# Patient Record
Sex: FEMALE | Race: ASIAN | NOT HISPANIC OR LATINO
[De-identification: names, ages, dates, MRNs, and addresses within clinical notes are randomized per-mention and may not be internally consistent; named-entity substitution may affect disease eponyms.]

---

## 2022-12-14 ENCOUNTER — APPOINTMENT (OUTPATIENT)
Dept: INTERNAL MEDICINE | Facility: CLINIC | Age: 22
End: 2022-12-14

## 2023-01-05 ENCOUNTER — APPOINTMENT (OUTPATIENT)
Dept: ORTHOPEDIC SURGERY | Facility: CLINIC | Age: 23
End: 2023-01-05
Payer: COMMERCIAL

## 2023-01-05 DIAGNOSIS — M46.1 SACROILIITIS, NOT ELSEWHERE CLASSIFIED: ICD-10-CM

## 2023-01-05 DIAGNOSIS — S39.012A STRAIN OF MUSCLE, FASCIA AND TENDON OF LOWER BACK, INITIAL ENCOUNTER: ICD-10-CM

## 2023-01-05 DIAGNOSIS — S23.9XXA SPRAIN OF UNSPECIFIED PARTS OF THORAX, INITIAL ENCOUNTER: ICD-10-CM

## 2023-01-05 PROCEDURE — 72070 X-RAY EXAM THORAC SPINE 2VWS: CPT

## 2023-01-05 PROCEDURE — 99203 OFFICE O/P NEW LOW 30 MIN: CPT

## 2023-01-05 PROCEDURE — 72170 X-RAY EXAM OF PELVIS: CPT

## 2023-01-05 PROCEDURE — 72110 X-RAY EXAM L-2 SPINE 4/>VWS: CPT

## 2023-01-05 NOTE — HISTORY OF PRESENT ILLNESS
[Mid-back] : mid-back [Lower back] : lower back [8] : 8 [7] : 7 [Sharp] : sharp [Intermittent] : intermittent [Nothing helps with pain getting better] : Nothing helps with pain getting better [Standing] : standing [Walking] : walking [de-identified] : 1/5/23- JAZLYN 22 year old F here for Mid and lower Back and Bilateral hip, onset pain for 5 month since giving birth \par Had epidural and spinal HA. No bb incontinence. \par  [] : no [FreeTextEntry1] : Bilateral Hips  [FreeTextEntry7] : lower back to

## 2023-01-05 NOTE — IMAGING
[de-identified] : LSPINE\par Inspection: No rash or ecchymosis\par Palpation: No tenderness to palpation or spasm in bilateral thoracic and lumbar paraspinal musculature, L SI joint tenderness to palpation\par ROM: Full with stiffness\par Strength: 5/5 bilateral hip flexors, knee extensors, ankle dorsiflexors, EHL, ankle plantarflexors\par Sensation: Sensation present to light touch bilateral L2-S1 distributions\par Provocative maneuvers: Negative R straight leg raise; + L SLR\par \par Bilateral hips-\par Palpation: No tenderness to palpation over greater trochanter or IT band\par ROM: No pain with flexion and internal rotation  [No bony abnormalities] : No bony abnormalities [AP] : anteroposterior [There are no fractures, subluxations or dislocations. No significant abnormalities are seen] : There are no fractures, subluxations or dislocations. No significant abnormalities are seen [Straightening consistent with spasm] : Straightening consistent with spasm

## 2023-01-19 ENCOUNTER — TRANSCRIPTION ENCOUNTER (OUTPATIENT)
Age: 23
End: 2023-01-19

## 2023-01-23 ENCOUNTER — NON-APPOINTMENT (OUTPATIENT)
Age: 23
End: 2023-01-23

## 2023-01-23 ENCOUNTER — APPOINTMENT (OUTPATIENT)
Dept: INTERNAL MEDICINE | Facility: CLINIC | Age: 23
End: 2023-01-23
Payer: COMMERCIAL

## 2023-01-23 VITALS
HEART RATE: 94 BPM | RESPIRATION RATE: 15 BRPM | SYSTOLIC BLOOD PRESSURE: 111 MMHG | BODY MASS INDEX: 24.24 KG/M2 | DIASTOLIC BLOOD PRESSURE: 77 MMHG | HEIGHT: 64 IN | TEMPERATURE: 98.7 F | WEIGHT: 142 LBS | OXYGEN SATURATION: 98 %

## 2023-01-23 DIAGNOSIS — Z87.19 PERSONAL HISTORY OF OTHER DISEASES OF THE DIGESTIVE SYSTEM: ICD-10-CM

## 2023-01-23 DIAGNOSIS — Z83.49 FAMILY HISTORY OF OTHER ENDOCRINE, NUTRITIONAL AND METABOLIC DISEASES: ICD-10-CM

## 2023-01-23 DIAGNOSIS — N63.10 UNSPECIFIED LUMP IN THE RIGHT BREAST, UNSPECIFIED QUADRANT: ICD-10-CM

## 2023-01-23 DIAGNOSIS — Z82.49 FAMILY HISTORY OF ISCHEMIC HEART DISEASE AND OTHER DISEASES OF THE CIRCULATORY SYSTEM: ICD-10-CM

## 2023-01-23 DIAGNOSIS — F32.A DEPRESSION, UNSPECIFIED: ICD-10-CM

## 2023-01-23 PROCEDURE — G0444 DEPRESSION SCREEN ANNUAL: CPT | Mod: 59

## 2023-01-23 PROCEDURE — 90471 IMMUNIZATION ADMIN: CPT

## 2023-01-23 PROCEDURE — 90686 IIV4 VACC NO PRSV 0.5 ML IM: CPT

## 2023-01-23 PROCEDURE — 99395 PREV VISIT EST AGE 18-39: CPT | Mod: 25

## 2023-01-24 ENCOUNTER — APPOINTMENT (OUTPATIENT)
Dept: OBGYN | Facility: CLINIC | Age: 23
End: 2023-01-24
Payer: COMMERCIAL

## 2023-01-24 VITALS
SYSTOLIC BLOOD PRESSURE: 104 MMHG | WEIGHT: 140 LBS | DIASTOLIC BLOOD PRESSURE: 74 MMHG | BODY MASS INDEX: 23.9 KG/M2 | HEIGHT: 64 IN

## 2023-01-24 PROCEDURE — 99385 PREV VISIT NEW AGE 18-39: CPT

## 2023-01-24 NOTE — REASON FOR VISIT
[Initial] : an initial consultation for [Spouse] : spouse [Patient Declined  Services] : - None: Patient declined  services [FreeTextEntry3] :  to translate

## 2023-01-24 NOTE — HISTORY OF PRESENT ILLNESS
[Condoms] : uses condoms [Y] : Patient is sexually active [FreeTextEntry1] : Establish care \par Moved here from [facility] \par She had a negative breast biopsy -fibroma of breast  [TextBox_31] : never  [PGxTotal] : 1 [facility] : [facility]

## 2023-01-24 NOTE — PLAN
[FreeTextEntry1] : Patient to return post menses for pap \par discuss Gardasil and will get post breast feeding\par

## 2023-01-24 NOTE — PHYSICAL EXAM
[Chaperone Present] : A chaperone was present in the examining room during all aspects of the physical examination [Appropriately responsive] : appropriately responsive [Alert] : alert [No Acute Distress] : no acute distress [No Lymphadenopathy] : no lymphadenopathy [Regular Rate Rhythm] : regular rate rhythm [No Murmurs] : no murmurs [Clear to Auscultation B/L] : clear to auscultation bilaterally [Soft] : soft [Non-tender] : non-tender [Non-distended] : non-distended [No HSM] : No HSM [No Lesions] : no lesions [No Mass] : no mass [Oriented x3] : oriented x3 [Examination Of The Breasts] : a normal appearance [Normal] : normal [No Masses] : no breast masses were palpable [FreeTextEntry1] : patient with menses

## 2023-01-26 LAB
ALBUMIN SERPL ELPH-MCNC: 4.7 G/DL
ALP BLD-CCNC: 95 U/L
ALT SERPL-CCNC: 28 U/L
ANION GAP SERPL CALC-SCNC: 12 MMOL/L
AST SERPL-CCNC: 26 U/L
BASOPHILS # BLD AUTO: 0.04 K/UL
BASOPHILS NFR BLD AUTO: 0.6 %
BILIRUB SERPL-MCNC: 0.6 MG/DL
BUN SERPL-MCNC: 8 MG/DL
CALCIUM SERPL-MCNC: 10.2 MG/DL
CHLORIDE SERPL-SCNC: 105 MMOL/L
CHOLEST SERPL-MCNC: 215 MG/DL
CO2 SERPL-SCNC: 22 MMOL/L
CREAT SERPL-MCNC: 0.71 MG/DL
EGFR: 122 ML/MIN/1.73M2
EOSINOPHIL # BLD AUTO: 0.25 K/UL
EOSINOPHIL NFR BLD AUTO: 3.8 %
ESTIMATED AVERAGE GLUCOSE: 105 MG/DL
GLUCOSE SERPL-MCNC: 87 MG/DL
HBA1C MFR BLD HPLC: 5.3 %
HCT VFR BLD CALC: 41.5 %
HDLC SERPL-MCNC: 29 MG/DL
HGB BLD-MCNC: 12.8 G/DL
IMM GRANULOCYTES NFR BLD AUTO: 0.3 %
LDLC SERPL CALC-MCNC: 112 MG/DL
LYMPHOCYTES # BLD AUTO: 1.99 K/UL
LYMPHOCYTES NFR BLD AUTO: 30.2 %
MAN DIFF?: NORMAL
MCHC RBC-ENTMCNC: 26.1 PG
MCHC RBC-ENTMCNC: 30.8 GM/DL
MCV RBC AUTO: 84.5 FL
MONOCYTES # BLD AUTO: 0.49 K/UL
MONOCYTES NFR BLD AUTO: 7.4 %
NEUTROPHILS # BLD AUTO: 3.79 K/UL
NEUTROPHILS NFR BLD AUTO: 57.7 %
NONHDLC SERPL-MCNC: 187 MG/DL
PLATELET # BLD AUTO: 274 K/UL
POTASSIUM SERPL-SCNC: 4.4 MMOL/L
PROT SERPL-MCNC: 7.7 G/DL
RBC # BLD: 4.91 M/UL
RBC # FLD: 13.5 %
SODIUM SERPL-SCNC: 139 MMOL/L
TRIGL SERPL-MCNC: 371 MG/DL
TSH SERPL-ACNC: 1.38 UIU/ML
WBC # FLD AUTO: 6.58 K/UL

## 2023-02-01 ENCOUNTER — APPOINTMENT (OUTPATIENT)
Dept: OTOLARYNGOLOGY | Facility: CLINIC | Age: 23
End: 2023-02-01

## 2023-02-03 ENCOUNTER — APPOINTMENT (OUTPATIENT)
Dept: ULTRASOUND IMAGING | Facility: CLINIC | Age: 23
End: 2023-02-03

## 2023-02-06 ENCOUNTER — APPOINTMENT (OUTPATIENT)
Dept: OBGYN | Facility: CLINIC | Age: 23
End: 2023-02-06
Payer: COMMERCIAL

## 2023-02-06 ENCOUNTER — NON-APPOINTMENT (OUTPATIENT)
Age: 23
End: 2023-02-06

## 2023-02-06 ENCOUNTER — APPOINTMENT (OUTPATIENT)
Dept: ALLERGY | Facility: CLINIC | Age: 23
End: 2023-02-06
Payer: COMMERCIAL

## 2023-02-06 VITALS
BODY MASS INDEX: 23.9 KG/M2 | TEMPERATURE: 98 F | SYSTOLIC BLOOD PRESSURE: 100 MMHG | WEIGHT: 140 LBS | OXYGEN SATURATION: 97 % | HEIGHT: 64 IN | HEART RATE: 78 BPM | DIASTOLIC BLOOD PRESSURE: 70 MMHG

## 2023-02-06 VITALS
HEIGHT: 64 IN | DIASTOLIC BLOOD PRESSURE: 78 MMHG | WEIGHT: 140 LBS | BODY MASS INDEX: 23.9 KG/M2 | SYSTOLIC BLOOD PRESSURE: 121 MMHG

## 2023-02-06 DIAGNOSIS — L85.8 OTHER SPECIFIED EPIDERMAL THICKENING: ICD-10-CM

## 2023-02-06 LAB
HCG UR QL: NEGATIVE
QUALITY CONTROL: YES

## 2023-02-06 PROCEDURE — 81025 URINE PREGNANCY TEST: CPT

## 2023-02-06 PROCEDURE — 99203 OFFICE O/P NEW LOW 30 MIN: CPT | Mod: 25

## 2023-02-06 PROCEDURE — 90471 IMMUNIZATION ADMIN: CPT

## 2023-02-06 PROCEDURE — 99213 OFFICE O/P EST LOW 20 MIN: CPT | Mod: 25

## 2023-02-06 PROCEDURE — 95004 PERQ TESTS W/ALRGNC XTRCS: CPT

## 2023-02-06 PROCEDURE — 90651 9VHPV VACCINE 2/3 DOSE IM: CPT

## 2023-02-06 PROCEDURE — 95018 ALL TSTG PERQ&IQ DRUGS/BIOL: CPT

## 2023-02-06 NOTE — HISTORY OF PRESENT ILLNESS
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

## 2023-02-07 ENCOUNTER — APPOINTMENT (OUTPATIENT)
Dept: INTERNAL MEDICINE | Facility: CLINIC | Age: 23
End: 2023-02-07

## 2023-02-09 ENCOUNTER — NON-APPOINTMENT (OUTPATIENT)
Age: 23
End: 2023-02-09

## 2023-02-09 ENCOUNTER — TRANSCRIPTION ENCOUNTER (OUTPATIENT)
Age: 23
End: 2023-02-09

## 2023-02-09 DIAGNOSIS — N39.0 URINARY TRACT INFECTION, SITE NOT SPECIFIED: ICD-10-CM

## 2023-02-09 LAB — BACTERIA UR CULT: ABNORMAL

## 2023-02-13 ENCOUNTER — NON-APPOINTMENT (OUTPATIENT)
Age: 23
End: 2023-02-13

## 2023-02-13 LAB — CYTOLOGY CVX/VAG DOC THIN PREP: NORMAL

## 2023-02-14 ENCOUNTER — NON-APPOINTMENT (OUTPATIENT)
Age: 23
End: 2023-02-14

## 2023-02-14 ENCOUNTER — EMERGENCY (EMERGENCY)
Facility: HOSPITAL | Age: 23
LOS: 1 days | Discharge: HOME | End: 2023-02-14
Attending: EMERGENCY MEDICINE
Payer: COMMERCIAL

## 2023-02-14 VITALS
DIASTOLIC BLOOD PRESSURE: 71 MMHG | RESPIRATION RATE: 19 BRPM | SYSTOLIC BLOOD PRESSURE: 112 MMHG | TEMPERATURE: 98 F | HEART RATE: 89 BPM | HEIGHT: 64 IN | OXYGEN SATURATION: 100 % | WEIGHT: 139.99 LBS

## 2023-02-14 VITALS
HEART RATE: 82 BPM | TEMPERATURE: 98 F | OXYGEN SATURATION: 100 % | SYSTOLIC BLOOD PRESSURE: 111 MMHG | RESPIRATION RATE: 18 BRPM | DIASTOLIC BLOOD PRESSURE: 71 MMHG

## 2023-02-14 DIAGNOSIS — N63.10 UNSPECIFIED LUMP IN THE RIGHT BREAST, UNSPECIFIED QUADRANT: ICD-10-CM

## 2023-02-14 LAB
ALBUMIN SERPL ELPH-MCNC: 4.5 G/DL — SIGNIFICANT CHANGE UP (ref 3.3–5)
ALP SERPL-CCNC: 77 U/L — SIGNIFICANT CHANGE UP (ref 40–120)
ALT FLD-CCNC: 30 U/L — SIGNIFICANT CHANGE UP (ref 10–45)
ANION GAP SERPL CALC-SCNC: 11 MMOL/L — SIGNIFICANT CHANGE UP (ref 5–17)
AST SERPL-CCNC: 24 U/L — SIGNIFICANT CHANGE UP (ref 10–40)
BASOPHILS # BLD AUTO: 0.03 K/UL — SIGNIFICANT CHANGE UP (ref 0–0.2)
BASOPHILS NFR BLD AUTO: 0.4 % — SIGNIFICANT CHANGE UP (ref 0–2)
BILIRUB SERPL-MCNC: 0.6 MG/DL — SIGNIFICANT CHANGE UP (ref 0.2–1.2)
BUN SERPL-MCNC: 9 MG/DL — SIGNIFICANT CHANGE UP (ref 7–23)
CALCIUM SERPL-MCNC: 9.7 MG/DL — SIGNIFICANT CHANGE UP (ref 8.4–10.5)
CHLORIDE SERPL-SCNC: 103 MMOL/L — SIGNIFICANT CHANGE UP (ref 96–108)
CO2 SERPL-SCNC: 22 MMOL/L — SIGNIFICANT CHANGE UP (ref 22–31)
CREAT SERPL-MCNC: 0.72 MG/DL — SIGNIFICANT CHANGE UP (ref 0.5–1.3)
D DIMER BLD IA.RAPID-MCNC: 160 NG/ML DDU — SIGNIFICANT CHANGE UP
EGFR: 120 ML/MIN/1.73M2 — SIGNIFICANT CHANGE UP
EOSINOPHIL # BLD AUTO: 0.24 K/UL — SIGNIFICANT CHANGE UP (ref 0–0.5)
EOSINOPHIL NFR BLD AUTO: 3.2 % — SIGNIFICANT CHANGE UP (ref 0–6)
GLUCOSE SERPL-MCNC: 118 MG/DL — HIGH (ref 70–99)
HCT VFR BLD CALC: 40.2 % — SIGNIFICANT CHANGE UP (ref 34.5–45)
HGB BLD-MCNC: 12.5 G/DL — SIGNIFICANT CHANGE UP (ref 11.5–15.5)
IMM GRANULOCYTES NFR BLD AUTO: 0.3 % — SIGNIFICANT CHANGE UP (ref 0–0.9)
LYMPHOCYTES # BLD AUTO: 2.19 K/UL — SIGNIFICANT CHANGE UP (ref 1–3.3)
LYMPHOCYTES # BLD AUTO: 28.9 % — SIGNIFICANT CHANGE UP (ref 13–44)
MCHC RBC-ENTMCNC: 26.2 PG — LOW (ref 27–34)
MCHC RBC-ENTMCNC: 31.1 GM/DL — LOW (ref 32–36)
MCV RBC AUTO: 84.1 FL — SIGNIFICANT CHANGE UP (ref 80–100)
MONOCYTES # BLD AUTO: 0.49 K/UL — SIGNIFICANT CHANGE UP (ref 0–0.9)
MONOCYTES NFR BLD AUTO: 6.5 % — SIGNIFICANT CHANGE UP (ref 2–14)
NEUTROPHILS # BLD AUTO: 4.62 K/UL — SIGNIFICANT CHANGE UP (ref 1.8–7.4)
NEUTROPHILS NFR BLD AUTO: 60.7 % — SIGNIFICANT CHANGE UP (ref 43–77)
NRBC # BLD: 0 /100 WBCS — SIGNIFICANT CHANGE UP (ref 0–0)
PLATELET # BLD AUTO: 236 K/UL — SIGNIFICANT CHANGE UP (ref 150–400)
POTASSIUM SERPL-MCNC: 4 MMOL/L — SIGNIFICANT CHANGE UP (ref 3.5–5.3)
POTASSIUM SERPL-SCNC: 4 MMOL/L — SIGNIFICANT CHANGE UP (ref 3.5–5.3)
PROT SERPL-MCNC: 7.8 G/DL — SIGNIFICANT CHANGE UP (ref 6–8.3)
RBC # BLD: 4.78 M/UL — SIGNIFICANT CHANGE UP (ref 3.8–5.2)
RBC # FLD: 13.9 % — SIGNIFICANT CHANGE UP (ref 10.3–14.5)
SODIUM SERPL-SCNC: 136 MMOL/L — SIGNIFICANT CHANGE UP (ref 135–145)
WBC # BLD: 7.59 K/UL — SIGNIFICANT CHANGE UP (ref 3.8–10.5)
WBC # FLD AUTO: 7.59 K/UL — SIGNIFICANT CHANGE UP (ref 3.8–10.5)

## 2023-02-14 PROCEDURE — 84484 ASSAY OF TROPONIN QUANT: CPT

## 2023-02-14 PROCEDURE — 99285 EMERGENCY DEPT VISIT HI MDM: CPT | Mod: 25

## 2023-02-14 PROCEDURE — 93308 TTE F-UP OR LMTD: CPT

## 2023-02-14 PROCEDURE — 80053 COMPREHEN METABOLIC PANEL: CPT

## 2023-02-14 PROCEDURE — 85379 FIBRIN DEGRADATION QUANT: CPT

## 2023-02-14 PROCEDURE — 71046 X-RAY EXAM CHEST 2 VIEWS: CPT | Mod: 26

## 2023-02-14 PROCEDURE — 71046 X-RAY EXAM CHEST 2 VIEWS: CPT

## 2023-02-14 PROCEDURE — 36415 COLL VENOUS BLD VENIPUNCTURE: CPT

## 2023-02-14 PROCEDURE — 93005 ELECTROCARDIOGRAM TRACING: CPT

## 2023-02-14 PROCEDURE — 99285 EMERGENCY DEPT VISIT HI MDM: CPT

## 2023-02-14 PROCEDURE — 85025 COMPLETE CBC W/AUTO DIFF WBC: CPT

## 2023-02-14 NOTE — ED ADULT NURSE NOTE - OBJECTIVE STATEMENT
Pt is 22Y F, dneies pmhx, c/o epigastric chest pain, SOB, for 3 days. Pt went to  today for continuing symptoms, EKG done, told to go to ED for follow up. Pt has epigastric pain, no NVD, no increased pain when breathing. Pt denies any other symptoms, Pt is A&Ox3, ambulatory,  at bedside, updated on plan of care

## 2023-02-14 NOTE — ED PROVIDER NOTE - PATIENT PORTAL LINK FT
You can access the FollowMyHealth Patient Portal offered by [facility] by registering at the following website: [email or web address]. By joining [email or web address] FollowMyHealth portal, you will also be able to view your health information using other applications (apps) compatible with our system.

## 2023-02-14 NOTE — ED ADULT NURSE NOTE - NS ED NURSE LEVEL OF CONSCIOUSNESS AFFECT
Calm I will SWITCH the dose or number of times a day I take the medications listed below when I get home from the hospital:  None

## 2023-02-14 NOTE — ED PROVIDER NOTE - NSFOLLOWUPINSTRUCTIONS_ED_ALL_ED_FT
Rest. Stay well hydrated.   Take all of your other medications as previously prescribed.     Please follow up with your Primary Care Provider and Cardiologist in office this week for further evaluation and management.  You may follow up with [facility], please call [phone] to make an appointment.    *** You will be called to assist with making an appointment with a cardiologist.     Show copies of your reports given to you.      Return to ER for any worsening or continued chest pain, shortness of breath, weakness, passing out, or any other concerning symptoms.

## 2023-02-14 NOTE — ED PROVIDER NOTE - OBJECTIVE STATEMENT
23yo F with no PMH presenting with VARGHESE x 3-4 days. Pt declined [facility] , prefers  at bedside to assist with translation. Pt reports she noticed VARGHESE while walking around and carrying her son, resolves with rest. Has also been experiencing unrelated mid-upper chest tightness/discomfort, nonradiating, occurs randomly. of note, took 1 OCP pill and plan b prior to onset of symptoms. Pt was told to go to  for EKG and they sent her here to r/o PE. Denies fever/chills, cough, recent illness, abdominal pain, n/v, LE pain/swelling, back pain. + family history of CAD in parents. No personal history of smoking, alcohol use, or heart disease. 21yo F with PMH of HLD presenting with VARGHESE x 3-4 days. Pt declined [facility] , prefers  at bedside to assist with translation. Pt reports she noticed VARGHESE while walking around and carrying her son over the last few days, resolves with rest. Has also been experiencing unrelated mid-upper chest tightness/discomfort, nonradiating, occurs randomly. Of note, took 1 OCP pill and plan b prior to onset of symptoms. Pt was told to go to  for EKG and they sent her here to r/o PE. Denies fever/chills, cough, recent illness, abdominal pain, n/v, LE pain/swelling, back pain. + family history of CAD in parents. No personal history of smoking, alcohol use, or heart disease.

## 2023-02-14 NOTE — ED PROVIDER NOTE - RAPID ASSESSMENT
22y F presents to the ED c/o mid upper cp, difficulty breathing and sob xfewdays. Denies cough and fever. Took 1 ocp pill and plan b 3 days ago, pt was told to go to  for EKG and they sent her here to r/o PE. Pt is well appearing in triage.     Azalia AUGUST) have documented this rapid assessment note under the dictation of Kenny Evans) which has been reviewed and affirmed to be accurate. Patient was seen as a QDOC patient. The patient will be seen and further worked up in the main emergency department and their care will be completed by the main emergency department team along with a thorough physical exam. Receiving team will follow up on labs, analgesia, any clinical imaging, reassess and disposition as clinically indicated, all decisions regarding the progression of care will be made at their discretion. 22y F presents to the ED c/o mid upper cp, difficulty breathing and sob xfewdays. Denies cough and fever. Took 1 ocp pill and plan b 3 days ago, pt was told to go to  for EKG and they sent her here to r/o PE. Pt is well appearing in triage.     Azalia AUGUST (Gina) have documented this rapid assessment note under the dictation of Kenny Evans) which has been reviewed and affirmed to be accurate. Patient was seen as a QDOC patient. The patient will be seen and further worked up in the main emergency department and their care will be completed by the main emergency department team along with a thorough physical exam. Receiving team will follow up on labs, analgesia, any clinical imaging, reassess and disposition as clinically indicated, all decisions regarding the progression of care will be made at their discretion.    IDr. Evans saw patient as a rapid assessment initially.  The rest of care was performed by another attending, and the rapid assessment was documented by gina in my presence. Receiving team will follow up on labs, analgesia, any clinical imaging, and perform reassessment and disposition of the patient as clinically indicated. All decisions regarding the progression of care will be made at their discretion.

## 2023-02-14 NOTE — ED PROVIDER NOTE - PROGRESS NOTE DETAILS
Trop negative, Upreg negative, POCUS TTE unremarkable. Pt stable for d/c will give rapid cards f/u. - Divina Infante PA-C

## 2023-02-14 NOTE — ED ADULT TRIAGE NOTE - LANGUAGE ASSISTANCE NEEDED
pt's spouse translating at present per request/Yes-Patient/Caregiver accepts free interpretation services...

## 2023-02-14 NOTE — ED PROVIDER NOTE - ATTENDING APP SHARED VISIT CONTRIBUTION OF CARE
22-year-old female history of HLD, presenting with dyspnea on exertion for approximately 4 days, associated with mild chest tightness.  No cough no fever no congestion.  No leg swelling.  No recent travel or immobilization.  No history of malignancies.  Reports recent OCP use.  No recent illness.  On exam patient is well-appearing in no acute distress.  Heart sounds are normal to auscultation S1-S2 regular rate rhythm no murmurs gallops or rubs.  Lungs are clear to auscultation bilaterally.  Abdomen is soft nontender nondistended.  There is no peripheral edema or JVD noted.  No overlying rashes on chest.  No chest wall tenderness.  There is no calf tenderness to palpation bilaterally.    Initial evaluation in triage during high-volume times: Included labs CBC to evaluate for anemia CMP to evaluate for electrolyte abnormalities or renal/liver dysfunction, D-dimer to evaluate for risk for pulmonary embolism (low risk by Wells criteria but cannot PERC out due to recent OCP use), and troponin (low risk for ACS).  EKG obtained at that time showed no evidence of acute ischemia or infarct.  Chest x-ray obtained shows no evidence of pneumonia pneumothorax or effusion/edema.  Lab results obtained and have no acute actionable findings.  D-dimer was not significantly elevated, pulmonary embolism is not suspected at this time.  Troponin is not elevated, in setting of normal EKG not concerning for myocarditis pericarditis or acute ACS.  He is low risk for major cardiac events, can consider follow-up with cardiology as outpatient.  Point-of-care ultrasound was performed showing no evidence of effusion no evidence of gross hypokinesis.  Suspect either GI or MSK source for chest pain and refer for outpatient for further testing.

## 2023-02-15 ENCOUNTER — NON-APPOINTMENT (OUTPATIENT)
Age: 23
End: 2023-02-15

## 2023-02-15 PROCEDURE — 93308 TTE F-UP OR LMTD: CPT | Mod: 26

## 2023-02-16 ENCOUNTER — APPOINTMENT (OUTPATIENT)
Dept: INTERNAL MEDICINE | Facility: CLINIC | Age: 23
End: 2023-02-16

## 2023-02-16 ENCOUNTER — APPOINTMENT (OUTPATIENT)
Dept: GASTROENTEROLOGY | Facility: CLINIC | Age: 23
End: 2023-02-16
Payer: COMMERCIAL

## 2023-02-16 VITALS
BODY MASS INDEX: 23.73 KG/M2 | SYSTOLIC BLOOD PRESSURE: 110 MMHG | TEMPERATURE: 97.7 F | WEIGHT: 139 LBS | DIASTOLIC BLOOD PRESSURE: 72 MMHG | HEART RATE: 85 BPM | HEIGHT: 64 IN | OXYGEN SATURATION: 99 %

## 2023-02-16 DIAGNOSIS — R93.2 ABNORMAL FINDINGS ON DIAGNOSTIC IMAGING OF LIVER AND BILIARY TRACT: ICD-10-CM

## 2023-02-16 DIAGNOSIS — K21.9 GASTRO-ESOPHAGEAL REFLUX DISEASE W/OUT ESOPHAGITIS: ICD-10-CM

## 2023-02-16 PROCEDURE — 99204 OFFICE O/P NEW MOD 45 MIN: CPT | Mod: 25

## 2023-02-16 NOTE — HISTORY OF PRESENT ILLNESS
5/10 labs reviewed:  (+) HAV & HBV immunity  HIV screen neg  HCV RNA > 10,000 IU/mL  CBC, INR, TSH normal  CMP normal other than TBili 1.9, ALT 55  Fe studies normal  SMA 1:80  SAMANTHA, AMA, IgG normal / neg  Cryoglobulin - trace (+)    Pt notified via [facility] - pt has significant malaise and joint pain, giving him trouble at work and cryoglobulin is positive. Can we try and push Epclusa through now? (He was on our July Medicaid list)   [FreeTextEntry1] : 22-year-old female in excellent health referred for follow-up of an abnormal gallbladder ultrasound obtained at the time of her  in September.  Patient had been experiencing epigastric pain during her pregnancy not related to meals.  Abdominal sonogram done after delivery which showed mild gallbladder wall thickening, some sludge in gallbladder but no stones.  Over the subsequent months pain disappeared and patient now only experiences occasional heartburn relieved by Pepcid.  Recent labs show high cholesterol but it was nonfasting.  Unknown if LFTs were normal.

## 2023-02-16 NOTE — ASSESSMENT
[FreeTextEntry1] : Impression: Mild gallbladder wall thickening after delivery by  probably related to pregnancy.  Symptoms do not suggest biliary colic.  Pain resolved.  Patient has mild GERD symptoms currently and probably had more severe GERD symptoms with esophageal spasm during her pregnancy causing the epigastric pain.  Elevated cholesterol but done nonfasting.\par \par Plan: We will repeat sonogram.  Send CMP and lipid profile.  We will give omeprazole 20 mg daily to be used as needed.  No indication for EGD at this time.  Further management pending results

## 2023-02-17 LAB
ALBUMIN SERPL ELPH-MCNC: 4.8 G/DL
ALP BLD-CCNC: 77 U/L
ALT SERPL-CCNC: 22 U/L
ANION GAP SERPL CALC-SCNC: 13 MMOL/L
AST SERPL-CCNC: 22 U/L
BILIRUB SERPL-MCNC: 0.4 MG/DL
BUN SERPL-MCNC: 8 MG/DL
CALCIUM SERPL-MCNC: 9.8 MG/DL
CHLORIDE SERPL-SCNC: 104 MMOL/L
CHOLEST SERPL-MCNC: 223 MG/DL
CO2 SERPL-SCNC: 17 MMOL/L
CREAT SERPL-MCNC: 0.67 MG/DL
EGFR: 125 ML/MIN/1.73M2
GLUCOSE SERPL-MCNC: 90 MG/DL
HDLC SERPL-MCNC: 24 MG/DL
LDLC SERPL CALC-MCNC: 125 MG/DL
NONHDLC SERPL-MCNC: 198 MG/DL
POTASSIUM SERPL-SCNC: 4.4 MMOL/L
PROT SERPL-MCNC: 7.3 G/DL
SODIUM SERPL-SCNC: 135 MMOL/L
TRIGL SERPL-MCNC: 365 MG/DL

## 2023-02-21 NOTE — HISTORY OF PRESENT ILLNESS
[de-identified] :  translated for his spouse.   Wife born in [facility] and speaks no English.    Rash and itching on her shoulders ongoing for a few years - she was treated with ketoconazole cream for her neck.   She has not been treated with any medication for her present symptoms. \par \par [place] to [place].   Met her  in [facility].  \par \par Patient with long history of nasal allergies with dust exposure

## 2023-02-21 NOTE — CONSULT LETTER
[Dear  ___] : Dear  [unfilled], [Thank you for referring [unfilled] for consultation for _____] : Thank you for referring [unfilled] for consultation for [unfilled] [Please see my note below.] : Please see my note below. [Sincerely,] : Sincerely, [FreeTextEntry3] : Mitchell B. Boxer, M.D., FAAAAI\par [facility]\par \par Department of Allergy-Immunology\par [facility] \par [address] [place] [address]\par [place] [address]\par Tel:   [phone]\par Fax:  [phone]\par Email: [email or web address]\par

## 2023-02-21 NOTE — SOCIAL HISTORY
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

## 2023-02-21 NOTE — PHYSICAL EXAM
[Alert] : alert [Well Nourished] : well nourished [Healthy Appearance] : healthy appearance [No Acute Distress] : no acute distress [Well Developed] : well developed [Normal Voice/Communication] : normal voice communication [No Neck Mass] : no neck mass was observed [No LAD] : no lymphadenopathy [Normal Rate and Effort] : normal respiratory rhythm and effort [No Crackles] : no crackles [No Retractions] : no retractions [Normal Rate] : heart rate was normal  [Normal S1, S2] : normal S1 and S2 [Regular Rhythm] : with a regular rhythm [Normal Cervical Lymph Nodes] : cervical [Wheezing] : no wheezing was heard [de-identified] : excoriated papules on upper triceps

## 2023-02-21 NOTE — ASSESSMENT
[FreeTextEntry1] : Keratosis pilaris:\par \par Aquaphor to upper arms BID\par Diprolene cream BID prn \par \par Perennial rhinitis:\par \par No treatment needed at this time.

## 2023-02-27 ENCOUNTER — NON-APPOINTMENT (OUTPATIENT)
Age: 23
End: 2023-02-27

## 2023-02-27 DIAGNOSIS — N39.0 URINARY TRACT INFECTION, SITE NOT SPECIFIED: ICD-10-CM

## 2023-02-27 DIAGNOSIS — A49.9 URINARY TRACT INFECTION, SITE NOT SPECIFIED: ICD-10-CM

## 2023-02-27 RX ORDER — SULFAMETHOXAZOLE AND TRIMETHOPRIM 800; 160 MG/1; MG/1
800-160 TABLET ORAL TWICE DAILY
Qty: 10 | Refills: 0 | Status: COMPLETED | COMMUNITY
Start: 2023-02-27 | End: 2023-03-04

## 2023-02-28 ENCOUNTER — APPOINTMENT (OUTPATIENT)
Dept: OBGYN | Facility: CLINIC | Age: 23
End: 2023-02-28
Payer: COMMERCIAL

## 2023-02-28 LAB
BILIRUB UR QL STRIP: NORMAL
GLUCOSE UR-MCNC: NORMAL
HCG UR QL: 0.2 EU/DL
HGB UR QL STRIP.AUTO: NORMAL
KETONES UR-MCNC: NORMAL
LEUKOCYTE ESTERASE UR QL STRIP: NORMAL
NITRITE UR QL STRIP: NORMAL
PH UR STRIP: 5.5
PROT UR STRIP-MCNC: NORMAL
SP GR UR STRIP: 1.01

## 2023-02-28 PROCEDURE — 99214 OFFICE O/P EST MOD 30 MIN: CPT

## 2023-02-28 PROCEDURE — 81003 URINALYSIS AUTO W/O SCOPE: CPT | Mod: QW

## 2023-02-28 NOTE — PLAN
[FreeTextEntry1] : Patient is a 22 year old, seen for complaints of recurrent burning in vaginal area that worsens with urination. \par  \par #Vaginal Irritation\par - UA/Urine Culture obtained\par - Affirm obtained and sent\par - GC/CT testing sent\par - STI testing offered\par - Discussed importance of continued condom use for pregnancy prevention\par - Advised patient to continue course of antibiotics as prescriber by provider\par - Discussed abstinence until vaginal symptoms resolve\par - Patient verbalized understanding\par - Will f/u once labs are resulted

## 2023-02-28 NOTE — PHYSICAL EXAM
[Appropriately responsive] : appropriately responsive [Alert] : alert [No Acute Distress] : no acute distress [Soft] : soft [Non-tender] : non-tender [Non-distended] : non-distended [Oriented x3] : oriented x3 [Labia Majora] : normal [Labia Minora] : normal [Normal] : normal [Discharge] : discharge [Scant] : scant [Brown] : brown [Thin] : thin [Foul Smelling] : not foul smelling

## 2023-02-28 NOTE — REASON FOR VISIT
[Follow-Up] : a follow-up evaluation of [Spouse] : spouse [Patient Declined  Services] : - None: Patient declined  services [FreeTextEntry3] : Patient prefers  to translate for her.

## 2023-02-28 NOTE — HISTORY OF PRESENT ILLNESS
[FreeTextEntry1] : Patient is a 22 year old, presenting for recurrent burning in vaginal area with urination.  \par Denies pelvic pain at this time.\par Patient was given a prescription of Nitrofurantoin for UTI by a family provider but did not have a formal work up.\par Denies any unusual vaginal discharge at this time.\par Contraception:  not currently on anything\par LMP: 2/22/23\par SexualHx: Last encounter was right before menses\par \par \par \par

## 2023-03-01 LAB
C TRACH RRNA SPEC QL NAA+PROBE: NOT DETECTED
N GONORRHOEA RRNA SPEC QL NAA+PROBE: NOT DETECTED
SOURCE AMPLIFICATION: NORMAL

## 2023-03-02 ENCOUNTER — APPOINTMENT (OUTPATIENT)
Dept: GASTROENTEROLOGY | Facility: CLINIC | Age: 23
End: 2023-03-02

## 2023-03-02 LAB
BACTERIA UR CULT: NORMAL
CANDIDA VAG CYTO: NOT DETECTED
G VAGINALIS+PREV SP MTYP VAG QL MICRO: NOT DETECTED
T VAGINALIS VAG QL WET PREP: NOT DETECTED

## 2023-03-07 ENCOUNTER — APPOINTMENT (OUTPATIENT)
Dept: CARDIOLOGY | Facility: CLINIC | Age: 23
End: 2023-03-07
Payer: COMMERCIAL

## 2023-03-07 ENCOUNTER — NON-APPOINTMENT (OUTPATIENT)
Age: 23
End: 2023-03-07

## 2023-03-07 ENCOUNTER — APPOINTMENT (OUTPATIENT)
Dept: OTOLARYNGOLOGY | Facility: CLINIC | Age: 23
End: 2023-03-07
Payer: COMMERCIAL

## 2023-03-07 ENCOUNTER — APPOINTMENT (OUTPATIENT)
Dept: INTERNAL MEDICINE | Facility: CLINIC | Age: 23
End: 2023-03-07

## 2023-03-07 VITALS
HEART RATE: 68 BPM | SYSTOLIC BLOOD PRESSURE: 121 MMHG | HEIGHT: 64 IN | BODY MASS INDEX: 23.73 KG/M2 | DIASTOLIC BLOOD PRESSURE: 81 MMHG | OXYGEN SATURATION: 100 % | WEIGHT: 139 LBS

## 2023-03-07 VITALS
WEIGHT: 139 LBS | HEIGHT: 64 IN | HEART RATE: 85 BPM | DIASTOLIC BLOOD PRESSURE: 78 MMHG | BODY MASS INDEX: 23.73 KG/M2 | SYSTOLIC BLOOD PRESSURE: 116 MMHG

## 2023-03-07 DIAGNOSIS — H93.293 OTHER ABNORMAL AUDITORY PERCEPTIONS, BILATERAL: ICD-10-CM

## 2023-03-07 DIAGNOSIS — R26.89 OTHER ABNORMALITIES OF GAIT AND MOBILITY: ICD-10-CM

## 2023-03-07 PROCEDURE — 99204 OFFICE O/P NEW MOD 45 MIN: CPT

## 2023-03-07 PROCEDURE — 99203 OFFICE O/P NEW LOW 30 MIN: CPT | Mod: 25

## 2023-03-07 PROCEDURE — 93000 ELECTROCARDIOGRAM COMPLETE: CPT

## 2023-03-07 PROCEDURE — 92557 COMPREHENSIVE HEARING TEST: CPT

## 2023-03-07 PROCEDURE — 92567 TYMPANOMETRY: CPT

## 2023-03-07 RX ORDER — AMPICILLIN 500 MG/1
500 CAPSULE ORAL 4 TIMES DAILY
Qty: 20 | Refills: 0 | Status: COMPLETED | COMMUNITY
Start: 2023-02-09 | End: 2023-03-07

## 2023-03-07 NOTE — DISCUSSION/SUMMARY
[FreeTextEntry1] : The patient is a 22-year-old female with atypical chest pain, dyspnea and an abnormal EKG.\par #1 CV- deeply inverted T waves in the anterior and inferior leads, recommend complete echo done in cardiology and exercise stress test\par #2 General-she does have an infant and some of her symptoms may be related to the care of the baby.  All questions answered. [EKG obtained to assist in diagnosis and management of assessed problem(s)] : EKG obtained to assist in diagnosis and management of assessed problem(s)

## 2023-03-07 NOTE — HISTORY OF PRESENT ILLNESS
[FreeTextEntry1] : Tammie is a 22-year-old female who presents for evaluation of abnormal ECG. She was in the ER with chest pain, SOB and dizziness. ECG abnormal but labs and echo normal. A year ago also presented with SOB and found to have abnormal ECG. She was at ENT today for dizziness. \par Pain is sharp and lasts few minutes then resolves. \par SOB when walking around with baby separately.

## 2023-03-09 ENCOUNTER — APPOINTMENT (OUTPATIENT)
Dept: INTERNAL MEDICINE | Facility: CLINIC | Age: 23
End: 2023-03-09

## 2023-03-13 PROBLEM — H93.293 OTHER ABNORMAL AUDITORY PERCEPTIONS, BILATERAL: Status: ACTIVE | Noted: 2023-03-13

## 2023-03-13 PROBLEM — R26.89 IMBALANCE: Status: ACTIVE | Noted: 2023-03-13

## 2023-03-13 NOTE — REASON FOR VISIT
[Initial Consultation] : an initial consultation for [Spouse] : spouse [FreeTextEntry2] : hearing loss, dizziness and nasal congestion

## 2023-03-13 NOTE — DATA REVIEWED
[de-identified] : An audiogram was ordered and performed including pure tones, tympanometry and speech testing for the patients complaint of hearing loss\par I have independently reviewed the patient's audiogram from today and my findings include \par AU Hearing -8k hz. AD Tymp As. AS Tymp A

## 2023-03-13 NOTE — HISTORY OF PRESENT ILLNESS
[de-identified] : 22 year old female presents for initial consultation for hearing loss, dizziness and nasal congestion \par Gradual hearing loss for 3 years bilaterally. \par Family History Mother unclear when\par No injury or trauma to ears. \par Denies history of otalgia, otorrhea, ear infections ear surgeries. \par No history of head/otologic trauma, no chemo therapy, IV antibiotics or ototoxins. \par Denies loud noise exposure. \par Last audiogram conducted. \par Patient denies cat scan or MRI\par \par Spouse reports dizziness 2x/weekly treated with Meclizine with relief. \par Most recent episode 2 days ago-symptoms have resolved. \par occurs when shes doing somethings\par Previously seen [place] ENT doctor ([place] of country[place]states doctor associated dizziness with hearing loss \par Patient reports tinnitus bilaterally for 3 years. \par Tinnitus volume increases in quiet rooms. \par Patient reports nasal congestion starting during pregnancy. \par Previous use of OTC nasal sprays with minimal relief. \par No CT scan or MRI \par Patient denies otalgia, otorrhea, ear infections, vertigo, headaches related to hearing.

## 2023-03-13 NOTE — PHYSICAL EXAM
[Hearing Loss Right Only] : normal [Hearing Loss Left Only] : normal [Nystagmus] : ~T no ~M nystagmus was seen [Fukuda Step Test] : Fukuda Step Test was Positive [Romberg's Sign] : Romberg's sign was present [Austin-Hallnataliiake] : Paragon-Hallpike: Negative [Normal] : mucosa is normal [Midline] : trachea located in midline position

## 2023-03-16 ENCOUNTER — APPOINTMENT (OUTPATIENT)
Dept: PAIN MANAGEMENT | Facility: CLINIC | Age: 23
End: 2023-03-16
Payer: COMMERCIAL

## 2023-03-16 VITALS
HEART RATE: 93 BPM | DIASTOLIC BLOOD PRESSURE: 72 MMHG | SYSTOLIC BLOOD PRESSURE: 110 MMHG | BODY MASS INDEX: 23.73 KG/M2 | WEIGHT: 139 LBS | HEIGHT: 64 IN

## 2023-03-16 DIAGNOSIS — R51.9 HEADACHE, UNSPECIFIED: ICD-10-CM

## 2023-03-16 PROCEDURE — 99204 OFFICE O/P NEW MOD 45 MIN: CPT

## 2023-03-20 ENCOUNTER — APPOINTMENT (OUTPATIENT)
Dept: UROGYNECOLOGY | Facility: CLINIC | Age: 23
End: 2023-03-20
Payer: COMMERCIAL

## 2023-03-23 ENCOUNTER — APPOINTMENT (OUTPATIENT)
Dept: OTOLARYNGOLOGY | Facility: CLINIC | Age: 23
End: 2023-03-23

## 2023-03-24 NOTE — HISTORY OF PRESENT ILLNESS
[Headache] : headache [Dizziness] : dizziness [Neck Pain] : neck pain [___ Times Per Week] : [unfilled] times each week [FreeTextEntry1] : Pt with headaches and dizziness since 2021. Headaches occur ~3-4x./ week . \par Whole head - sharp and throbbing . Tylenol  is helpful to abort pain . \par Pt does have neck pain as well. Denies sensitivity to light or sound , denies nausea and vomiting. Sleep 8 hours / night, does not drink water throughout the day , eats well. + chews tobacco , denies alcohol. \par \par Past Med Hx - high cholesterol , dizziness, abnormal ECG\par Past Surg Hx - c-sections 6 months ago - health baby\par \par Denies concussion \par Lives with  and baby\par Immigrated from [facility] last year. \par \par  [Nausea] : no nausea [Vomiting] : no Vomiting [Photophobia] : no photophobia [Phonophobia] : no phonophobia [Scotoma] : no scotoma [Numbness] : no numbness [Tingling] : no tingling [Weakness] : no weakness [Scalp Tenderness] : no scalp tenderness

## 2023-03-24 NOTE — REVIEW OF SYSTEMS
[Dizziness] : dizziness [Fever] : no fever [Chills] : no chills [Chest Pain] : no chest pain [Palpitations] : no palpitations [Shortness Of Breath] : no shortness of breath [Fainting] : no fainting

## 2023-03-24 NOTE — PHYSICAL EXAM
[General Appearance - Alert] : alert [General Appearance - In No Acute Distress] : in no acute distress [General Appearance - Well Nourished] : well nourished [General Appearance - Well Developed] : well developed [General Appearance - Well-Appearing] : healthy appearing [Oriented To Time, Place, And Person] : oriented to person, place, and time [Affect] : the affect was normal [Mood] : the mood was normal [Cranial Nerves Facial (VII)] : face symmetrical [Cranial Nerves Accessory (XI - Cranial And Spinal)] : head turning and shoulder shrug symmetric [Cranial Nerves Hypoglossal (XII)] : there was no tongue deviation with protrusion [Motor Strength] : muscle strength was normal in all four extremities [Involuntary Movements] : no involuntary movements were seen [No Muscle Atrophy] : normal bulk in all four extremities [2+] : Brachioradialis left 2+ [Sclera] : the sclera and conjunctiva were normal [PERRL With Normal Accommodation] : pupils were equal in size, round, reactive to light, with normal accommodation [Extraocular Movements] : extraocular movements were intact [] : no respiratory distress [Abnormal Walk] : normal gait [Paresis Pronator Drift Right-Sided] : no pronator drift on the right [Paresis Pronator Drift Left-Sided] : no pronator drift on the left [Motor Strength Upper Extremities Bilaterally] : strength was normal in both upper extremities [Motor Strength Lower Extremities Bilaterally] : strength was normal in both lower extremities [Romberg's Sign] : Romberg's sign was negtive [Coordination - Dysmetria Impaired Finger-to-Nose Bilateral] : not present

## 2023-03-24 NOTE — ASSESSMENT
[FreeTextEntry1] : 22 year old woman with headaches for 2 years .\par _MRI \par _will consider Nortriptyline. \par \par ADD: I spoke to patient's  and he confirmed headache pain is worse over the past 2 days.

## 2023-03-27 ENCOUNTER — APPOINTMENT (OUTPATIENT)
Dept: PAIN MANAGEMENT | Facility: CLINIC | Age: 23
End: 2023-03-27

## 2023-03-28 ENCOUNTER — APPOINTMENT (OUTPATIENT)
Dept: UROGYNECOLOGY | Facility: CLINIC | Age: 23
End: 2023-03-28
Payer: COMMERCIAL

## 2023-03-28 VITALS
BODY MASS INDEX: 23.73 KG/M2 | HEIGHT: 64 IN | HEART RATE: 84 BPM | WEIGHT: 139 LBS | DIASTOLIC BLOOD PRESSURE: 81 MMHG | SYSTOLIC BLOOD PRESSURE: 116 MMHG

## 2023-03-28 DIAGNOSIS — N89.8 OTHER SPECIFIED NONINFLAMMATORY DISORDERS OF VAGINA: ICD-10-CM

## 2023-03-28 LAB
BILIRUB UR QL STRIP: NORMAL
CLARITY UR: CLEAR
COLLECTION METHOD: NORMAL
GLUCOSE UR-MCNC: NORMAL
HCG UR QL: 0.2 EU/DL
HGB UR QL STRIP.AUTO: ABNORMAL
KETONES UR-MCNC: NORMAL
LEUKOCYTE ESTERASE UR QL STRIP: NORMAL
NITRITE UR QL STRIP: NORMAL
PH UR STRIP: 5.5
PROT UR STRIP-MCNC: NORMAL
SP GR UR STRIP: 1.02

## 2023-03-28 PROCEDURE — 99204 OFFICE O/P NEW MOD 45 MIN: CPT | Mod: 25

## 2023-03-28 PROCEDURE — 51701 INSERT BLADDER CATHETER: CPT

## 2023-03-28 NOTE — PHYSICAL EXAM
[No Acute Distress] : in no acute distress [Well developed] : well developed [Well Nourished] : ~L well nourished [Good Hygeine] : demonstrates good hygeine [Oriented x3] : oriented to person, place, and time [Normal Memory] : ~T memory was ~L unimpaired [Normal Mood/Affect] : mood and affect are normal [Respirations regular] : ~T respiratory rate was regular [No Edema] : ~T edema was not present [Pink Rugae] : pink rugae [Normal] : normal [Anxiety] : patient is not anxious [FreeTextEntry4] : + scant white discharge and decreased laxity and irritation/inflammation of the vaginal walls noted. [de-identified] : PVR not done as patient reported that was unable to void as she had just urinated prior to coming to the office.

## 2023-03-28 NOTE — PROCEDURE
[Straight Catheterization] : insertion of a straight catheter [Urinary Tract Infection] : a urinary tract infection [Patient] : the patient [Allergies Reviewed] : Allergies reviewed [None] : none [___ Fr Straight Tip] : a [unfilled] in Indian straight tip catheter [No Complications] : no complications [Tolerated Well] : the patient tolerated the procedure well [Post procedure instructions and information given] : Post procedure instructions and information were given and reviewed with patient. [0] : 0

## 2023-03-28 NOTE — HISTORY OF PRESENT ILLNESS
[FreeTextEntry1] : JAZLYN CHURCH is a 22 year old presenting for evaluation of vaginal burning with urination x 2 weeks after completing antibiotics for UTI. She reports burning worsens after urination and persists for 3 hours afterward before resolving until the next time she urinate. She is currently observing Ramadan and does not drink any fluid during the daytime.. She voids 3 times during the daytime and denies nocturia. She also reports some vaginal  discharge of the last week, white, sometimes thick, sometimes thin. She denies any hematuria, urgency or urinary frequency. Reports a history of frequent UTIs? Does not know if urine culture are always positive. Most recent urine culture noted on 2/28 negative.

## 2023-03-28 NOTE — DISCUSSION/SUMMARY
[FreeTextEntry1] : Unclear etiology of symptoms, likely vaginitis based on pelvic exam today.\par Will follow-up UA/UCx. Will also follow-up AFFIRM culture and treat as needed.\par Encouraged adequate hydration 2-2.5 L daily.\par Will call with results and RTO PRN.

## 2023-03-28 NOTE — REASON FOR VISIT
[Initial Visit ___] : an initial visit for [unfilled] [Questionnaire Received] : Patient questionnaire received [Intake Form Reviewed] : Patient intake form with past medical history, surgical history, family history and social history reviewed today [Spouse] : spouse [Declined  Services] : Patient was offered free  services in ~his/her~ preferred language and declined services. Patient insisted on family member providing interpretation

## 2023-03-30 DIAGNOSIS — Z83.42 FAMILY HISTORY OF FAMILIAL HYPERCHOLESTEROLEMIA: ICD-10-CM

## 2023-03-30 LAB
APPEARANCE: CLEAR
BACTERIA UR CULT: NORMAL
BACTERIA: NEGATIVE
BILIRUBIN URINE: NEGATIVE
BLOOD URINE: NEGATIVE
CANDIDA VAG CYTO: NOT DETECTED
COLOR: NORMAL
G VAGINALIS+PREV SP MTYP VAG QL MICRO: DETECTED
GLUCOSE QUALITATIVE U: NEGATIVE
HYALINE CASTS: 2 /LPF
KETONES URINE: NEGATIVE
LEUKOCYTE ESTERASE URINE: NEGATIVE
MICROSCOPIC-UA: NORMAL
NITRITE URINE: NEGATIVE
PH URINE: 6
PROTEIN URINE: NEGATIVE
RED BLOOD CELLS URINE: 4 /HPF
SPECIFIC GRAVITY URINE: 1.01
SQUAMOUS EPITHELIAL CELLS: 1 /HPF
T VAGINALIS VAG QL WET PREP: NOT DETECTED
UROBILINOGEN URINE: NORMAL
WHITE BLOOD CELLS URINE: 0 /HPF

## 2023-03-30 RX ORDER — COVID-19 ANTIGEN TEST
KIT MISCELLANEOUS
Qty: 2 | Refills: 0 | Status: DISCONTINUED | COMMUNITY
Start: 2023-03-24

## 2023-04-04 ENCOUNTER — APPOINTMENT (OUTPATIENT)
Dept: OTOLARYNGOLOGY | Facility: CLINIC | Age: 23
End: 2023-04-04

## 2023-04-07 ENCOUNTER — APPOINTMENT (OUTPATIENT)
Dept: CV DIAGNOSTICS | Facility: HOSPITAL | Age: 23
End: 2023-04-07

## 2023-04-07 ENCOUNTER — OUTPATIENT (OUTPATIENT)
Dept: OUTPATIENT SERVICES | Facility: HOSPITAL | Age: 23
LOS: 1 days | End: 2023-04-07
Payer: COMMERCIAL

## 2023-04-07 ENCOUNTER — APPOINTMENT (OUTPATIENT)
Dept: CARDIOLOGY | Facility: CLINIC | Age: 23
End: 2023-04-07
Payer: COMMERCIAL

## 2023-04-07 DIAGNOSIS — R07.89 OTHER CHEST PAIN: ICD-10-CM

## 2023-04-07 PROCEDURE — 93017 CV STRESS TEST TRACING ONLY: CPT

## 2023-04-07 PROCEDURE — 93306 TTE W/DOPPLER COMPLETE: CPT

## 2023-04-07 PROCEDURE — 93018 CV STRESS TEST I&R ONLY: CPT

## 2023-04-07 PROCEDURE — 93016 CV STRESS TEST SUPVJ ONLY: CPT

## 2023-04-10 ENCOUNTER — RESULT CHARGE (OUTPATIENT)
Age: 23
End: 2023-04-10

## 2023-04-10 ENCOUNTER — NON-APPOINTMENT (OUTPATIENT)
Age: 23
End: 2023-04-10

## 2023-04-10 ENCOUNTER — APPOINTMENT (OUTPATIENT)
Dept: OBGYN | Facility: CLINIC | Age: 23
End: 2023-04-10
Payer: COMMERCIAL

## 2023-04-10 LAB — HCG UR QL: NEGATIVE

## 2023-04-10 PROCEDURE — 90651 9VHPV VACCINE 2/3 DOSE IM: CPT

## 2023-04-10 PROCEDURE — 90471 IMMUNIZATION ADMIN: CPT

## 2023-04-10 PROCEDURE — 81025 URINE PREGNANCY TEST: CPT

## 2023-05-04 ENCOUNTER — RX RENEWAL (OUTPATIENT)
Age: 23
End: 2023-05-04

## 2023-07-03 ENCOUNTER — RX RENEWAL (OUTPATIENT)
Age: 23
End: 2023-07-03

## 2023-08-24 ENCOUNTER — RX RENEWAL (OUTPATIENT)
Age: 23
End: 2023-08-24

## 2023-10-02 ENCOUNTER — RX RENEWAL (OUTPATIENT)
Age: 23
End: 2023-10-02

## 2023-10-10 ENCOUNTER — APPOINTMENT (OUTPATIENT)
Dept: INTERNAL MEDICINE | Facility: CLINIC | Age: 23
End: 2023-10-10

## 2023-10-17 ENCOUNTER — APPOINTMENT (OUTPATIENT)
Dept: PAIN MANAGEMENT | Facility: CLINIC | Age: 23
End: 2023-10-17
Payer: COMMERCIAL

## 2023-10-17 ENCOUNTER — APPOINTMENT (OUTPATIENT)
Dept: INTERNAL MEDICINE | Facility: CLINIC | Age: 23
End: 2023-10-17
Payer: COMMERCIAL

## 2023-10-17 VITALS
SYSTOLIC BLOOD PRESSURE: 113 MMHG | HEIGHT: 64 IN | OXYGEN SATURATION: 99 % | DIASTOLIC BLOOD PRESSURE: 76 MMHG | TEMPERATURE: 98.6 F | HEART RATE: 81 BPM | BODY MASS INDEX: 24.92 KG/M2 | WEIGHT: 146 LBS | RESPIRATION RATE: 15 BRPM

## 2023-10-17 VITALS
HEIGHT: 64 IN | DIASTOLIC BLOOD PRESSURE: 73 MMHG | BODY MASS INDEX: 24.92 KG/M2 | WEIGHT: 146 LBS | HEART RATE: 90 BPM | SYSTOLIC BLOOD PRESSURE: 109 MMHG

## 2023-10-17 DIAGNOSIS — R42 DIZZINESS AND GIDDINESS: ICD-10-CM

## 2023-10-17 DIAGNOSIS — Z23 ENCOUNTER FOR IMMUNIZATION: ICD-10-CM

## 2023-10-17 PROCEDURE — G0008: CPT

## 2023-10-17 PROCEDURE — 90686 IIV4 VACC NO PRSV 0.5 ML IM: CPT

## 2023-10-17 PROCEDURE — 99213 OFFICE O/P EST LOW 20 MIN: CPT

## 2023-10-17 PROCEDURE — 99215 OFFICE O/P EST HI 40 MIN: CPT | Mod: 25

## 2023-10-17 RX ORDER — NORTRIPTYLINE HYDROCHLORIDE 10 MG/1
10 CAPSULE ORAL
Qty: 60 | Refills: 2 | Status: DISCONTINUED | COMMUNITY
Start: 2023-04-04 | End: 2023-10-17

## 2023-10-17 RX ORDER — RIZATRIPTAN BENZOATE 10 MG/1
10 TABLET ORAL
Qty: 8 | Refills: 12 | Status: DISCONTINUED | COMMUNITY
Start: 2023-10-17 | End: 2023-10-17

## 2023-10-18 LAB
ALBUMIN SERPL ELPH-MCNC: 4.9 G/DL
ALP BLD-CCNC: 73 U/L
ALT SERPL-CCNC: 31 U/L
ANION GAP SERPL CALC-SCNC: 9 MMOL/L
APPEARANCE: ABNORMAL
AST SERPL-CCNC: 28 U/L
BACTERIA UR CULT: NORMAL
BACTERIA: ABNORMAL /HPF
BILIRUB SERPL-MCNC: 0.3 MG/DL
BILIRUBIN URINE: NEGATIVE
BLOOD URINE: NEGATIVE
BUN SERPL-MCNC: 8 MG/DL
CALCIUM SERPL-MCNC: 9.8 MG/DL
CAST: 0 /LPF
CHLORIDE SERPL-SCNC: 101 MMOL/L
CHOLEST SERPL-MCNC: 219 MG/DL
CO2 SERPL-SCNC: 24 MMOL/L
COLOR: YELLOW
CREAT SERPL-MCNC: 0.76 MG/DL
EGFR: 112 ML/MIN/1.73M2
EPITHELIAL CELLS: 5 /HPF
GLUCOSE QUALITATIVE U: NEGATIVE MG/DL
GLUCOSE SERPL-MCNC: 88 MG/DL
HCT VFR BLD CALC: 39.8 %
HDLC SERPL-MCNC: 37 MG/DL
HGB BLD-MCNC: 12.9 G/DL
KETONES URINE: NEGATIVE MG/DL
LDLC SERPL CALC-MCNC: 131 MG/DL
LEUKOCYTE ESTERASE URINE: ABNORMAL
MCHC RBC-ENTMCNC: 27.9 PG
MCHC RBC-ENTMCNC: 32.4 GM/DL
MCV RBC AUTO: 86.1 FL
MICROSCOPIC-UA: NORMAL
NITRITE URINE: NEGATIVE
NONHDLC SERPL-MCNC: 182 MG/DL
PH URINE: 6
PLATELET # BLD AUTO: 267 K/UL
POTASSIUM SERPL-SCNC: 4.2 MMOL/L
PROT SERPL-MCNC: 7.5 G/DL
PROTEIN URINE: NEGATIVE MG/DL
RBC # BLD: 4.62 M/UL
RBC # FLD: 13.1 %
RED BLOOD CELLS URINE: 2 /HPF
SODIUM SERPL-SCNC: 135 MMOL/L
SPECIFIC GRAVITY URINE: <1.005
TRIGL SERPL-MCNC: 285 MG/DL
UROBILINOGEN URINE: 0.2 MG/DL
WBC # FLD AUTO: 7.26 K/UL
WHITE BLOOD CELLS URINE: 6 /HPF

## 2023-10-20 ENCOUNTER — APPOINTMENT (OUTPATIENT)
Dept: UROGYNECOLOGY | Facility: CLINIC | Age: 23
End: 2023-10-20

## 2023-10-20 ENCOUNTER — APPOINTMENT (OUTPATIENT)
Dept: OBGYN | Facility: CLINIC | Age: 23
End: 2023-10-20
Payer: COMMERCIAL

## 2023-10-20 VITALS — SYSTOLIC BLOOD PRESSURE: 110 MMHG | DIASTOLIC BLOOD PRESSURE: 74 MMHG

## 2023-10-20 DIAGNOSIS — R35.0 FREQUENCY OF MICTURITION: ICD-10-CM

## 2023-10-20 LAB
BILIRUB UR QL STRIP: NORMAL
GLUCOSE UR-MCNC: NORMAL
HCG UR QL: 1 EU/DL
HGB UR QL STRIP.AUTO: NORMAL
KETONES UR-MCNC: NORMAL
LEUKOCYTE ESTERASE UR QL STRIP: NORMAL
NITRITE UR QL STRIP: POSITIVE
PH UR STRIP: 5
PROT UR STRIP-MCNC: NORMAL
SP GR UR STRIP: 1.01

## 2023-10-20 PROCEDURE — 99213 OFFICE O/P EST LOW 20 MIN: CPT

## 2023-10-23 LAB
BACTERIA UR CULT: ABNORMAL
CANDIDA VAG CYTO: NOT DETECTED
G VAGINALIS+PREV SP MTYP VAG QL MICRO: NOT DETECTED
T VAGINALIS VAG QL WET PREP: NOT DETECTED

## 2023-11-07 ENCOUNTER — RX CHANGE (OUTPATIENT)
Age: 23
End: 2023-11-07

## 2023-11-07 RX ORDER — NORGESTIMATE AND ETHINYL ESTRADIOL 7DAYSX3 LO
0.18/0.215/0.25 KIT ORAL DAILY
Qty: 1 | Refills: 1 | Status: DISCONTINUED | COMMUNITY
Start: 2023-10-17 | End: 2023-11-07

## 2023-12-12 ENCOUNTER — APPOINTMENT (OUTPATIENT)
Dept: OBGYN | Facility: CLINIC | Age: 23
End: 2023-12-12

## 2023-12-12 DIAGNOSIS — N89.8 OTHER SPECIFIED NONINFLAMMATORY DISORDERS OF VAGINA: ICD-10-CM

## 2023-12-27 NOTE — ED PROVIDER NOTE - NS ED ATTENDING STATEMENT MOD
Name band; This was a shared visit with the MARCO. I reviewed and verified the documentation and independently performed the documented:

## 2024-01-22 ENCOUNTER — APPOINTMENT (OUTPATIENT)
Dept: PAIN MANAGEMENT | Facility: CLINIC | Age: 24
End: 2024-01-22

## 2024-01-22 ENCOUNTER — APPOINTMENT (OUTPATIENT)
Dept: OBGYN | Facility: CLINIC | Age: 24
End: 2024-01-22
Payer: COMMERCIAL

## 2024-01-22 DIAGNOSIS — Z01.419 ENCOUNTER FOR GYNECOLOGICAL EXAMINATION (GENERAL) (ROUTINE) W/OUT ABNORMAL FINDINGS: ICD-10-CM

## 2024-01-22 DIAGNOSIS — R10.2 PELVIC AND PERINEAL PAIN: ICD-10-CM

## 2024-01-22 DIAGNOSIS — N94.9 UNSPECIFIED CONDITION ASSOCIATED WITH FEMALE GENITAL ORGANS AND MENSTRUAL CYCLE: ICD-10-CM

## 2024-01-22 DIAGNOSIS — Z23 ENCOUNTER FOR IMMUNIZATION: ICD-10-CM

## 2024-01-22 PROCEDURE — 99213 OFFICE O/P EST LOW 20 MIN: CPT

## 2024-01-22 NOTE — HISTORY OF PRESENT ILLNESS
[FreeTextEntry1] : patient complaining of white discharge 15 days after menses and pain during menses  She complains that her menses are heavy She is using condoms for birth control She does not want and IUD and had dizziness with the pill

## 2024-01-22 NOTE — REASON FOR VISIT
[Follow-Up] : a follow-up evaluation of [[employer] ] : provided by [employer]   [Interpreters_IDNumber] : [id number] [TWNoteComboBox1] : Joya

## 2024-01-22 NOTE — PLAN
[FreeTextEntry1] : most likely physiologic discharge Patient feels safe at home Will call with results

## 2024-01-22 NOTE — PHYSICAL EXAM
[Labia Minora] : normal [Labia Majora] : normal [Discharge] : a  ~M vaginal discharge was present [Scant] : scant [Normal] : normal [Uterine Adnexae] : normal

## 2024-01-23 ENCOUNTER — APPOINTMENT (OUTPATIENT)
Dept: PAIN MANAGEMENT | Facility: CLINIC | Age: 24
End: 2024-01-23

## 2024-01-23 LAB
C TRACH RRNA SPEC QL NAA+PROBE: NOT DETECTED
CANDIDA VAG CYTO: NOT DETECTED
G VAGINALIS+PREV SP MTYP VAG QL MICRO: NOT DETECTED
N GONORRHOEA RRNA SPEC QL NAA+PROBE: NOT DETECTED
SOURCE AMPLIFICATION: NORMAL
T VAGINALIS VAG QL WET PREP: NOT DETECTED

## 2024-01-31 ENCOUNTER — TRANSCRIPTION ENCOUNTER (OUTPATIENT)
Age: 24
End: 2024-01-31

## 2024-01-31 ENCOUNTER — NON-APPOINTMENT (OUTPATIENT)
Age: 24
End: 2024-01-31

## 2024-02-04 ENCOUNTER — NON-APPOINTMENT (OUTPATIENT)
Age: 24
End: 2024-02-04

## 2024-02-05 ENCOUNTER — TRANSCRIPTION ENCOUNTER (OUTPATIENT)
Age: 24
End: 2024-02-05

## 2024-02-06 ENCOUNTER — APPOINTMENT (OUTPATIENT)
Dept: PAIN MANAGEMENT | Facility: CLINIC | Age: 24
End: 2024-02-06

## 2024-02-07 ENCOUNTER — APPOINTMENT (OUTPATIENT)
Dept: PULMONOLOGY | Facility: CLINIC | Age: 24
End: 2024-02-07
Payer: COMMERCIAL

## 2024-02-07 ENCOUNTER — NON-APPOINTMENT (OUTPATIENT)
Age: 24
End: 2024-02-07

## 2024-02-07 ENCOUNTER — APPOINTMENT (OUTPATIENT)
Dept: CARDIOLOGY | Facility: CLINIC | Age: 24
End: 2024-02-07

## 2024-02-07 VITALS
DIASTOLIC BLOOD PRESSURE: 77 MMHG | SYSTOLIC BLOOD PRESSURE: 110 MMHG | WEIGHT: 147 LBS | HEART RATE: 84 BPM | OXYGEN SATURATION: 98 % | RESPIRATION RATE: 16 BRPM | HEIGHT: 64 IN | BODY MASS INDEX: 25.1 KG/M2

## 2024-02-07 DIAGNOSIS — R10.13 EPIGASTRIC PAIN: ICD-10-CM

## 2024-02-07 DIAGNOSIS — R06.00 DYSPNEA, UNSPECIFIED: ICD-10-CM

## 2024-02-07 DIAGNOSIS — Z83.79 FAMILY HISTORY OF OTHER DISEASES OF THE DIGESTIVE SYSTEM: ICD-10-CM

## 2024-02-07 DIAGNOSIS — J30.2 OTHER SEASONAL ALLERGIC RHINITIS: ICD-10-CM

## 2024-02-07 DIAGNOSIS — R06.89 DYSPNEA, UNSPECIFIED: ICD-10-CM

## 2024-02-07 DIAGNOSIS — R12 HEARTBURN: ICD-10-CM

## 2024-02-07 DIAGNOSIS — Z83.3 FAMILY HISTORY OF DIABETES MELLITUS: ICD-10-CM

## 2024-02-07 DIAGNOSIS — R39.9 UNSPECIFIED SYMPTOMS AND SIGNS INVOLVING THE GENITOURINARY SYSTEM: ICD-10-CM

## 2024-02-07 DIAGNOSIS — Z78.9 OTHER SPECIFIED HEALTH STATUS: ICD-10-CM

## 2024-02-07 PROCEDURE — 99203 OFFICE O/P NEW LOW 30 MIN: CPT

## 2024-02-11 ENCOUNTER — EMERGENCY (EMERGENCY)
Facility: HOSPITAL | Age: 24
LOS: 0 days | Discharge: HOME | End: 2024-02-11
Attending: EMERGENCY MEDICINE
Payer: COMMERCIAL

## 2024-02-11 VITALS
TEMPERATURE: 98 F | DIASTOLIC BLOOD PRESSURE: 67 MMHG | SYSTOLIC BLOOD PRESSURE: 111 MMHG | OXYGEN SATURATION: 100 % | HEART RATE: 75 BPM | RESPIRATION RATE: 14 BRPM

## 2024-02-11 VITALS
HEIGHT: 64 IN | RESPIRATION RATE: 20 BRPM | SYSTOLIC BLOOD PRESSURE: 132 MMHG | WEIGHT: 145.95 LBS | HEART RATE: 93 BPM | TEMPERATURE: 98 F | DIASTOLIC BLOOD PRESSURE: 89 MMHG | OXYGEN SATURATION: 100 %

## 2024-02-11 DIAGNOSIS — R07.89 OTHER CHEST PAIN: ICD-10-CM

## 2024-02-11 DIAGNOSIS — R06.02 SHORTNESS OF BREATH: ICD-10-CM

## 2024-02-11 LAB
ALBUMIN SERPL ELPH-MCNC: 3.7 G/DL — SIGNIFICANT CHANGE UP (ref 3.3–5)
ALP SERPL-CCNC: 57 U/L — SIGNIFICANT CHANGE UP (ref 40–120)
ALT FLD-CCNC: 53 U/L — SIGNIFICANT CHANGE UP (ref 12–78)
ANION GAP SERPL CALC-SCNC: 4 MMOL/L — LOW (ref 5–17)
AST SERPL-CCNC: 36 U/L — SIGNIFICANT CHANGE UP (ref 15–37)
BASOPHILS # BLD AUTO: 0.02 K/UL — SIGNIFICANT CHANGE UP (ref 0–0.2)
BASOPHILS NFR BLD AUTO: 0.2 % — SIGNIFICANT CHANGE UP (ref 0–2)
BILIRUB SERPL-MCNC: 0.4 MG/DL — SIGNIFICANT CHANGE UP (ref 0.2–1.2)
BUN SERPL-MCNC: 8 MG/DL — SIGNIFICANT CHANGE UP (ref 7–23)
CALCIUM SERPL-MCNC: 9.5 MG/DL — SIGNIFICANT CHANGE UP (ref 8.5–10.1)
CHLORIDE SERPL-SCNC: 109 MMOL/L — HIGH (ref 96–108)
CO2 SERPL-SCNC: 27 MMOL/L — SIGNIFICANT CHANGE UP (ref 22–31)
CREAT SERPL-MCNC: 0.78 MG/DL — SIGNIFICANT CHANGE UP (ref 0.5–1.3)
D DIMER BLD IA.RAPID-MCNC: <150 NG/ML DDU — SIGNIFICANT CHANGE UP
EGFR: 108 ML/MIN/1.73M2 — SIGNIFICANT CHANGE UP
EOSINOPHIL # BLD AUTO: 0.22 K/UL — SIGNIFICANT CHANGE UP (ref 0–0.5)
EOSINOPHIL NFR BLD AUTO: 2.6 % — SIGNIFICANT CHANGE UP (ref 0–6)
GLUCOSE SERPL-MCNC: 87 MG/DL — SIGNIFICANT CHANGE UP (ref 70–99)
HCG SERPL-ACNC: <1 MIU/ML — SIGNIFICANT CHANGE UP
HCT VFR BLD CALC: 38.5 % — SIGNIFICANT CHANGE UP (ref 34.5–45)
HGB BLD-MCNC: 12.7 G/DL — SIGNIFICANT CHANGE UP (ref 11.5–15.5)
IMM GRANULOCYTES NFR BLD AUTO: 0.5 % — SIGNIFICANT CHANGE UP (ref 0–0.9)
LIDOCAIN IGE QN: 30 U/L — SIGNIFICANT CHANGE UP (ref 13–75)
LYMPHOCYTES # BLD AUTO: 1.93 K/UL — SIGNIFICANT CHANGE UP (ref 1–3.3)
LYMPHOCYTES # BLD AUTO: 23.2 % — SIGNIFICANT CHANGE UP (ref 13–44)
MAGNESIUM SERPL-MCNC: 2.1 MG/DL — SIGNIFICANT CHANGE UP (ref 1.6–2.6)
MCHC RBC-ENTMCNC: 28 PG — SIGNIFICANT CHANGE UP (ref 27–34)
MCHC RBC-ENTMCNC: 33 G/DL — SIGNIFICANT CHANGE UP (ref 32–36)
MCV RBC AUTO: 85 FL — SIGNIFICANT CHANGE UP (ref 80–100)
MONOCYTES # BLD AUTO: 0.43 K/UL — SIGNIFICANT CHANGE UP (ref 0–0.9)
MONOCYTES NFR BLD AUTO: 5.2 % — SIGNIFICANT CHANGE UP (ref 2–14)
NEUTROPHILS # BLD AUTO: 5.68 K/UL — SIGNIFICANT CHANGE UP (ref 1.8–7.4)
NEUTROPHILS NFR BLD AUTO: 68.3 % — SIGNIFICANT CHANGE UP (ref 43–77)
NRBC # BLD: 0 /100 WBCS — SIGNIFICANT CHANGE UP (ref 0–0)
PLATELET # BLD AUTO: 247 K/UL — SIGNIFICANT CHANGE UP (ref 150–400)
POTASSIUM SERPL-MCNC: 3.7 MMOL/L — SIGNIFICANT CHANGE UP (ref 3.5–5.3)
POTASSIUM SERPL-SCNC: 3.7 MMOL/L — SIGNIFICANT CHANGE UP (ref 3.5–5.3)
PROT SERPL-MCNC: 7.9 GM/DL — SIGNIFICANT CHANGE UP (ref 6–8.3)
RBC # BLD: 4.53 M/UL — SIGNIFICANT CHANGE UP (ref 3.8–5.2)
RBC # FLD: 12.2 % — SIGNIFICANT CHANGE UP (ref 10.3–14.5)
SODIUM SERPL-SCNC: 140 MMOL/L — SIGNIFICANT CHANGE UP (ref 135–145)
TROPONIN I, HIGH SENSITIVITY RESULT: 3.2 NG/L — SIGNIFICANT CHANGE UP
WBC # BLD: 8.32 K/UL — SIGNIFICANT CHANGE UP (ref 3.8–10.5)
WBC # FLD AUTO: 8.32 K/UL — SIGNIFICANT CHANGE UP (ref 3.8–10.5)

## 2024-02-11 PROCEDURE — 71045 X-RAY EXAM CHEST 1 VIEW: CPT | Mod: 26

## 2024-02-11 PROCEDURE — 99285 EMERGENCY DEPT VISIT HI MDM: CPT

## 2024-02-11 PROCEDURE — 93010 ELECTROCARDIOGRAM REPORT: CPT

## 2024-02-11 RX ORDER — FAMOTIDINE 10 MG/ML
20 INJECTION INTRAVENOUS ONCE
Refills: 0 | Status: COMPLETED | OUTPATIENT
Start: 2024-02-11 | End: 2024-02-11

## 2024-02-11 RX ORDER — KETOROLAC TROMETHAMINE 30 MG/ML
15 SYRINGE (ML) INJECTION ONCE
Refills: 0 | Status: DISCONTINUED | OUTPATIENT
Start: 2024-02-11 | End: 2024-02-11

## 2024-02-11 RX ORDER — PANTOPRAZOLE SODIUM 20 MG/1
1 TABLET, DELAYED RELEASE ORAL
Qty: 14 | Refills: 0
Start: 2024-02-11 | End: 2024-02-24

## 2024-02-11 RX ADMIN — Medication 15 MILLIGRAM(S): at 21:30

## 2024-02-11 RX ADMIN — Medication 30 MILLILITER(S): at 20:48

## 2024-02-11 RX ADMIN — FAMOTIDINE 20 MILLIGRAM(S): 10 INJECTION INTRAVENOUS at 20:48

## 2024-02-11 RX ADMIN — Medication 15 MILLIGRAM(S): at 20:48

## 2024-02-11 NOTE — ED ADULT TRIAGE NOTE - GLASGOW COMA SCALE: SCORE, MLM
Patient Care Team:  Ashish Callejas MD as PCP - General (Sports Medicine)  Austin Granados MD as Cardiologist (Cardiology)  Chi Hunter MD as Consulting Physician (Urology)  Deborah Whitmore APRN as Nurse Practitioner (Family Medicine)  Pola Helm APRN as Nurse Practitioner (Nurse Practitioner)    Chief complaint cough and shortness of breath with fever    Subjective     Patient is a 87 y.o. male presents with symptoms of his respiratory infection. Onset of symptoms was abrupt starting 2 days ago.  Symptoms are associated with moderately productive cough as well as fever and chills at home.  His wife states that yesterday abruptly when she came home from work that he seemed more confused and had some word finding problems.  She was concerned of something more significant like a urinary tract infection.  Symptoms are aggravated by nothing prior to admission.   Symptoms improve with interventions given in the ER. Severity mild to moderate.  Context unknown at this point with work-up.  Does have significant cardiac history.  Work-up in the emergency room included troponin and EKG.  His troponin was mildly elevated in the 50s.  It did drop with a delta of -6.  He denies any current or active chest pain but has had intermittent mild pains about once a month.  He just recently followed up with his cardiologist in the office and was cleared for a year unless he began having symptoms.  He did have an episode of diarrhea in the emergency room which was sent for stool PCR.  That was his first episode.  States he is already feeling better today but still an occasional mild cough    Review of Systems   Pertinent items are noted in HPI    History  Past Medical History:   Diagnosis Date    Abnormal nuclear stress test     Anemia 2021    Arthritis     BPH (benign prostatic hyperplasia)     Coronary artery disease     Diabetes mellitus     Disease of thyroid gland     Fracture     spinal t12    GERD  (gastroesophageal reflux disease)     Glaucoma 02/18/2022    Gout     Hyperlipidemia LDL goal <70 11/14/2016    Ischemic heart disease 06/23/2022    Low back pain     LVH (left ventricular hypertrophy) 06/23/2022    Melanoma     Pacemaker 01/18/2019    PAF (paroxysmal atrial fibrillation) 10/29/2019    Chads vas score 3    Primary hypertension 11/14/2016    Prostate CA     Sick sinus syndrome 03/01/2019    Stress fracture Aug. 2021     Past Surgical History:   Procedure Laterality Date    APPENDECTOMY      CARDIAC CATHETERIZATION Left 12/10/2012    CARDIAC ELECTROPHYSIOLOGY PROCEDURE N/A 11/23/2018    Procedure: Pacemaker DC new 11/23/2018;  Surgeon: Austin Granados MD;  Location:  [facility] CATH [facility]ASIVE [facility];  Service: Cardiology    CHOLECYSTECTOMY      COLONOSCOPY N/A 06/09/2017    Tics, hemorrhoids repeat exam prn    COLONOSCOPY W/ POLYPECTOMY  02/16/2012    adenomatous polyp at 80cm    ENDOSCOPY N/A 09/13/2022    Procedure: ESOPHAGOGASTRODUODENOSCOPY WITH ANESTHESIA;  Surgeon: Felice Marroquin MD;  Location: [facility] ENDOSCOPY;  Service: Gastroenterology;  Laterality: N/A;  pre hematochezia  post; normal   Ashish Callejas MD    HAND SURGERY Right     HERNIA REPAIR      HIP CANNULATED SCREW PLACEMENT Left 01/18/2022    Procedure: HIP CANNULATED SCREW PLACEMENT;  Surgeon: Isaiah Sheehan MD;  Location:  [facility] OR;  Service: Orthopedics;  Laterality: Left;    INGUINAL HERNIA REPAIR      INSERT / REPLACE / REMOVE PACEMAKER      KNEE SURGERY      PROSTATE SURGERY      SKIN CANCER EXCISION      face    SKIN LESION EXCISION      TOTAL HIP ARTHROPLASTY Right 08/27/2021    Procedure: TOTAL HIP REPLACEMENT;  Surgeon: Arik Magaña MD;  Location:  [facility] OR;  Service: Orthopedics;  Laterality: Right;    TOTAL HIP ARTHROPLASTY Left 03/25/2022    Procedure: LEFT HIP SCREW REMOVAL / LEFT TOTAL HIP ARTHROPLASTY;  Surgeon: BASHIR Monsivais MD;  Location:  [facility] OR;  Service: Orthopedics;  Laterality: Left;      Family History   Problem Relation Age of Onset    Alzheimer's disease Mother     Cancer Father     Cancer Sister      Social History     Tobacco Use    Smoking status: Former     Years: 30.00     Types: Cigarettes     Quit date: 1985     Years since quittin.5     Passive exposure: Past    Smokeless tobacco: Never   Vaping Use    Vaping Use: Never used   Substance Use Topics    Alcohol use: No    Drug use: No     E-cigarette/Vaping    E-cigarette/Vaping Use Never User     Passive Exposure No     Counseling Given No      E-cigarette/Vaping Substances    Nicotine No     THC No     CBD No     Flavoring No      Facility-Administered Medications Prior to Admission   Medication Dose Route Frequency Provider Last Rate Last Admin    cyanocobalamin injection 1,000 mcg  1,000 mcg Intramuscular Q28 Days Jarad Alexis MD   1,000 mcg at 21 0901     Medications Prior to Admission   Medication Sig Dispense Refill Last Dose    aspirin 81 MG EC tablet Take 1 tablet by mouth Daily.   2023    allopurinol (ZYLOPRIM) 300 MG tablet TAKE 1 TABLET BY MOUTH EVERY DAY 90 tablet 3     benzonatate (TESSALON) 100 MG capsule As Needed.       docusate sodium (COLACE) 100 MG capsule Take 1 capsule by mouth 2 (Two) Times a Day.       Eliquis 5 MG tablet tablet TAKE 1 TABLET BY MOUTH EVERY 12 HOURS 180 tablet 4     folic acid (FOLVITE) 1 MG tablet Take 1 tablet by mouth Daily.       guaiFENesin (MUCINEX) 600 MG 12 hr tablet Every 12 (Twelve) Hours.       iron polysaccharides (NIFEREX) 150 MG capsule Take 1 capsule by mouth Daily.       levothyroxine (SYNTHROID, LEVOTHROID) 75 MCG tablet Take 1 tablet by mouth Daily.       LORazepam (ATIVAN) 0.5 MG tablet Take 1 tablet by mouth Daily As Needed for Anxiety. (Patient taking differently: Take 1 tablet by mouth Every Night.) 12 tablet 0     losartan (Cozaar) 25 MG tablet Take 1 tablet by mouth Daily. 90 tablet 3     methotrexate 2.5 MG tablet Take 7 tablets by mouth 1 (One)  15 Time Per Week.       metoprolol succinate XL (TOPROL-XL) 25 MG 24 hr tablet TAKE 1 TABLET BY MOUTH EVERY DAY 90 tablet 11     nitroglycerin (NITROSTAT) 0.4 MG SL tablet Nitrostat 0.4 mg sublingual tablet   Place 1 tablet as needed by sublingual route.       pantoprazole (PROTONIX) 40 MG EC tablet Take 1 tablet by mouth Daily.       predniSONE (DELTASONE) 5 MG tablet 2.5 mg.       triamterene-hydrochlorothiazide (MAXZIDE) 75-50 MG per tablet Take 0.5 tablets by mouth Daily. 37.5-25        Allergies:  Adhesive tape, Simvastatin, Tramadol, and Neosporin [neomycin-bacitracin zn-polymyx]    Objective     Vital Signs  Temp:  [97.7 °F (36.5 °C)-99 °F (37.2 °C)] 97.9 °F (36.6 °C)  Heart Rate:  [] 60  Resp:  [12-26] 16  BP: (105-133)/(54-91) 124/54    Physical Exam:    General Appearance alert, appears stated age, hard of hearing, and cooperative  Head normocephalic, without obvious abnormality and atraumatic  Eyes lids and lashes normal, conjunctivae and sclerae normal, no icterus, and no pallor  Nose nares normal, septum midline, and mucosa normal  Neck no adenopathy, supple, and no carotid bruit  Lungs respirations regular, respirations even, and respirations unlabored, rales bilateral lower  Heart normal S1, S2 and no murmur, no gallop, no rub  Abdomen normal bowel sounds, no masses, soft non-tender, and no guarding  Extremities no edema, no cyanosis, and no redness  Pulses Pulses palpable and equal bilaterally  Skin turgor normal and color normal  Neurologic Mental Status orientated to person, place, time and situation, Cranial Nerves cranial nerves 2 - 12 grossly intact as examined    Results Review:    I reviewed the patient's new clinical results.    Assessment & Plan       Elevated troponin    Single vessel coronary artery disease    Primary hypertension    Malignant neoplasm of prostate    Complete heart block    Pacemaker    Paroxysmal atrial flutter    Chronic anticoagulation    Ischemic heart disease     Rheumatoid arthritis    Essential hypertension    Degeneration of lumbar or lumbosacral intervertebral disc    Controlled type 2 diabetes mellitus without complication, without long-term current use of insulin    Symptoms consistent with some type of respiratory infection.  Work-up thus far negative for that.  Interestingly I do not see that he was tested for any viral pathogens and particularly COVID.  We will go ahead and order that just to maybe help clarify his symptoms.  Would also need to be in special isolation with other treatment interventions.  Not a candidate for any specific medications due to his long-term use of anticoagulation.  We will go ahead and see what that shows.  His only abnormality was the troponin and given his cardiac history emergency room physician felt strongly about him being admitted despite the decrease in number and lack of definitive chest pain.  He does have an elevated heart score but with his history and age as expected.  I have placed a consultation for cardiology to look at his numbers and symptoms and give any recommendations for any further testing.  Hopefully will be able to return home with wife soon.    I discussed the patients findings and my recommendations with patient and family.     Ashish Callejas MD  07/27/23  07:55 CDT    Time:  Greater than 45 minutes

## 2024-02-11 NOTE — ED PROVIDER NOTE - CLINICAL SUMMARY MEDICAL DECISION MAKING FREE TEXT BOX
Intermittent lower sternal chest pain  pain occurring not long after eating no acute distress, lungs clear.  Ischemic EKG unchanged from prior.  No pain at this time just minor SOB. Benign physical exam.  Possible GERD causing symptoms low suspicion for ACS and PE.  Will give pepcid, maalox, check labs, CXR; Re-eval. Intermittent lower sternal chest pain  pain occurring not long after eating no acute distress, lungs clear.  Ischemic EKG unchanged from prior.  No pain at this time just minor SOB. Benign physical exam.  Possible GERD causing symptoms low suspicion for ACS and PE.  Will give pepcid, maalox, check labs, CXR; Re-eval.    Improvement of symptoms.  Labs and CXR within normal limits.  Supportive care and follow-up discussed.  It was stressed that patient should also sit upraight for about 3 hours after eating before laying down or sleeping.

## 2024-02-11 NOTE — ED PROVIDER NOTE - CPE EDP SKIN NORM
From: Stacy VELASQUEZ Kim  To: Dayami Carmichael NP  Sent: 2/26/2018 3:52 PM CST  Subject: Jury Duty Medical Excuse Needed    Sarabjit Stock.    I've been summoned for jury duty in [facility]. As much as I would love to do this, I feel my medical state right now does not make me a good candidate. I'm having difficulty sitting for more than an hour or two. I stiffen up and get cramps in my posterior as well as legs. In addition, I feel my IBS and frequent urination would cause disruption.    As instructed, I need a medical letter seeking an excuse from this service. Can we handle this via this message or do you need to see me? You can fax a letter to [facility]  of Courts at [phone], identifying Stacy Alvarez as Juror ID [id number].    Thank you for your assistance with this matter. Please advise best way to handle this.     Again, thank you!   normal...

## 2024-02-11 NOTE — ED PROVIDER NOTE - CARE PROVIDER_API CALL
Cinthia Harper  Gastroenterology  [address] [place] [address]  Phone: [phone]  Fax: [phone]  Follow Up Time:     Sean Jaffe  Cardiovascular Disease  [address] [place] [address]  Phone: [phone]  Fax: [phone]  Follow Up Time:

## 2024-02-11 NOTE — ED PROVIDER NOTE - CARE PROVIDERS DIRECT ADDRESSES
,[email or web address]

## 2024-02-11 NOTE — ED ADULT TRIAGE NOTE - CHIEF COMPLAINT QUOTE
Pt c/o sob, chest pain and back pain. started last night at 3am. Pt can speak full sentences. Pmhx HDL, Low BP. NKDA

## 2024-02-11 NOTE — ED PROVIDER NOTE - PATIENT PORTAL LINK FT
You can access the FollowMyHealth Patient Portal offered by [facility] by registering at the following website: [email or web address]. By joining [employer]’s FollowMyHealth portal, you will also be able to view your health information using other applications (apps) compatible with our system.

## 2024-02-11 NOTE — ED PROVIDER NOTE - OBJECTIVE STATEMENT
This patient is a 23 year old woman who presents to the ER c/o SOB.  the symptoms began this morning around 3 am with lower sternal chest pain.  She describes the pain as "something stuck" that lasted for about an hour and then resolved.  Associated symptoms include SOB.  Her  who is at bedside states that the patient was having similar more vague symptoms the past 3 days after eating dried fish.  She had dried fish at 1am 2 hours prior to chest pain episode while she was sleeping.  He reports that her pulse ox was 99% but heart rate was 184.  The symptoms occurred again intermittently at 4 pm and continued so they came to be evaluated.  Currently she has no pain just mild SOB.  She denies fever, cough, leg swelling, hx of PE and DVT.  No recent surgery.  No hormonal use.  As per EMR patient has abnormal EKG and had a non-ischemic stress test April 2023.  Patient states that she has a known abnormal EKG since she was living in [facility].      : patient's

## 2024-02-11 NOTE — ED PROVIDER NOTE - NSFOLLOWUPINSTRUCTIONS_ED_ALL_ED_FT
1) Take prescription medication as instructed  2) For acute upper abdominal symptoms after eating consider taking yolis seltzer tablets over the counter as instructed  3) Follow-up with gastroenterology  4) Follow-up with cardiology  5) Follow up with your primary care doctor  6) Return to the ER for worsening or concerning symptoms

## 2024-02-11 NOTE — ED ADULT NURSE NOTE - OBJECTIVE STATEMENT
pt aox3 as per  pt has been having chest pain x3 weeks.  pt presents today with sob and "mild" chest pain.  states it feels like she can't catch her breath.  vss b/l lungs clear, back paini pt c/o is mid back and "comes and goes" pmh hld, pt chews tobacco as per .  LMP 1/26/24

## 2024-02-16 ENCOUNTER — EMERGENCY (EMERGENCY)
Facility: HOSPITAL | Age: 24
LOS: 1 days | Discharge: HOME | End: 2024-02-16
Attending: STUDENT IN AN ORGANIZED HEALTH CARE EDUCATION/TRAINING PROGRAM
Payer: COMMERCIAL

## 2024-02-16 VITALS
HEIGHT: 64 IN | RESPIRATION RATE: 19 BRPM | DIASTOLIC BLOOD PRESSURE: 87 MMHG | HEART RATE: 89 BPM | TEMPERATURE: 98 F | WEIGHT: 166.89 LBS | OXYGEN SATURATION: 100 % | SYSTOLIC BLOOD PRESSURE: 134 MMHG

## 2024-02-16 VITALS
RESPIRATION RATE: 16 BRPM | OXYGEN SATURATION: 99 % | TEMPERATURE: 98 F | SYSTOLIC BLOOD PRESSURE: 119 MMHG | HEART RATE: 75 BPM | DIASTOLIC BLOOD PRESSURE: 73 MMHG

## 2024-02-16 LAB
ALBUMIN SERPL ELPH-MCNC: 4.3 G/DL — SIGNIFICANT CHANGE UP (ref 3.3–5)
ALP SERPL-CCNC: 55 U/L — SIGNIFICANT CHANGE UP (ref 40–120)
ALT FLD-CCNC: 53 U/L — HIGH (ref 10–45)
ANION GAP SERPL CALC-SCNC: 14 MMOL/L — SIGNIFICANT CHANGE UP (ref 5–17)
AST SERPL-CCNC: 42 U/L — HIGH (ref 10–40)
BASE EXCESS BLDV CALC-SCNC: -0.8 MMOL/L — SIGNIFICANT CHANGE UP (ref -2–3)
BASOPHILS # BLD AUTO: 0.04 K/UL — SIGNIFICANT CHANGE UP (ref 0–0.2)
BASOPHILS NFR BLD AUTO: 0.5 % — SIGNIFICANT CHANGE UP (ref 0–2)
BILIRUB SERPL-MCNC: 0.6 MG/DL — SIGNIFICANT CHANGE UP (ref 0.2–1.2)
BUN SERPL-MCNC: 11 MG/DL — SIGNIFICANT CHANGE UP (ref 7–23)
CA-I SERPL-SCNC: 1.27 MMOL/L — SIGNIFICANT CHANGE UP (ref 1.15–1.33)
CALCIUM SERPL-MCNC: 9.9 MG/DL — SIGNIFICANT CHANGE UP (ref 8.4–10.5)
CHLORIDE BLDV-SCNC: 102 MMOL/L — SIGNIFICANT CHANGE UP (ref 96–108)
CHLORIDE SERPL-SCNC: 101 MMOL/L — SIGNIFICANT CHANGE UP (ref 96–108)
CO2 BLDV-SCNC: 27 MMOL/L — HIGH (ref 22–26)
CO2 SERPL-SCNC: 22 MMOL/L — SIGNIFICANT CHANGE UP (ref 22–31)
CREAT SERPL-MCNC: 0.86 MG/DL — SIGNIFICANT CHANGE UP (ref 0.5–1.3)
EGFR: 96 ML/MIN/1.73M2 — SIGNIFICANT CHANGE UP
EOSINOPHIL # BLD AUTO: 0.3 K/UL — SIGNIFICANT CHANGE UP (ref 0–0.5)
EOSINOPHIL NFR BLD AUTO: 4.1 % — SIGNIFICANT CHANGE UP (ref 0–6)
GAS PNL BLDV: 135 MMOL/L — LOW (ref 136–145)
GAS PNL BLDV: SIGNIFICANT CHANGE UP
GLUCOSE BLDV-MCNC: 77 MG/DL — SIGNIFICANT CHANGE UP (ref 70–99)
GLUCOSE SERPL-MCNC: 84 MG/DL — SIGNIFICANT CHANGE UP (ref 70–99)
HCO3 BLDV-SCNC: 25 MMOL/L — SIGNIFICANT CHANGE UP (ref 22–29)
HCT VFR BLD CALC: 37.8 % — SIGNIFICANT CHANGE UP (ref 34.5–45)
HCT VFR BLDA CALC: 37 % — SIGNIFICANT CHANGE UP (ref 34.5–46.5)
HGB BLD CALC-MCNC: 12.3 G/DL — SIGNIFICANT CHANGE UP (ref 11.7–16.1)
HGB BLD-MCNC: 12.1 G/DL — SIGNIFICANT CHANGE UP (ref 11.5–15.5)
IMM GRANULOCYTES NFR BLD AUTO: 0.3 % — SIGNIFICANT CHANGE UP (ref 0–0.9)
LACTATE BLDV-MCNC: 1.1 MMOL/L — SIGNIFICANT CHANGE UP (ref 0.5–2)
LYMPHOCYTES # BLD AUTO: 2.37 K/UL — SIGNIFICANT CHANGE UP (ref 1–3.3)
LYMPHOCYTES # BLD AUTO: 32.5 % — SIGNIFICANT CHANGE UP (ref 13–44)
MCHC RBC-ENTMCNC: 27.6 PG — SIGNIFICANT CHANGE UP (ref 27–34)
MCHC RBC-ENTMCNC: 32 GM/DL — SIGNIFICANT CHANGE UP (ref 32–36)
MCV RBC AUTO: 86.1 FL — SIGNIFICANT CHANGE UP (ref 80–100)
MONOCYTES # BLD AUTO: 0.63 K/UL — SIGNIFICANT CHANGE UP (ref 0–0.9)
MONOCYTES NFR BLD AUTO: 8.6 % — SIGNIFICANT CHANGE UP (ref 2–14)
NEUTROPHILS # BLD AUTO: 3.93 K/UL — SIGNIFICANT CHANGE UP (ref 1.8–7.4)
NEUTROPHILS NFR BLD AUTO: 54 % — SIGNIFICANT CHANGE UP (ref 43–77)
NRBC # BLD: 0 /100 WBCS — SIGNIFICANT CHANGE UP (ref 0–0)
PCO2 BLDV: 47 MMHG — HIGH (ref 39–42)
PH BLDV: 7.34 — SIGNIFICANT CHANGE UP (ref 7.32–7.43)
PLATELET # BLD AUTO: 218 K/UL — SIGNIFICANT CHANGE UP (ref 150–400)
PO2 BLDV: 32 MMHG — SIGNIFICANT CHANGE UP (ref 25–45)
POTASSIUM BLDV-SCNC: 3.4 MMOL/L — LOW (ref 3.5–5.1)
POTASSIUM SERPL-MCNC: 3.6 MMOL/L — SIGNIFICANT CHANGE UP (ref 3.5–5.3)
POTASSIUM SERPL-SCNC: 3.6 MMOL/L — SIGNIFICANT CHANGE UP (ref 3.5–5.3)
PROT SERPL-MCNC: 7.4 G/DL — SIGNIFICANT CHANGE UP (ref 6–8.3)
RBC # BLD: 4.39 M/UL — SIGNIFICANT CHANGE UP (ref 3.8–5.2)
RBC # FLD: 11.9 % — SIGNIFICANT CHANGE UP (ref 10.3–14.5)
SAO2 % BLDV: 56.8 % — LOW (ref 67–88)
SODIUM SERPL-SCNC: 137 MMOL/L — SIGNIFICANT CHANGE UP (ref 135–145)
TROPONIN T, HIGH SENSITIVITY RESULT: <6 NG/L — SIGNIFICANT CHANGE UP (ref 0–51)
WBC # BLD: 7.29 K/UL — SIGNIFICANT CHANGE UP (ref 3.8–10.5)
WBC # FLD AUTO: 7.29 K/UL — SIGNIFICANT CHANGE UP (ref 3.8–10.5)

## 2024-02-16 PROCEDURE — 85018 HEMOGLOBIN: CPT

## 2024-02-16 PROCEDURE — 84295 ASSAY OF SERUM SODIUM: CPT

## 2024-02-16 PROCEDURE — 83605 ASSAY OF LACTIC ACID: CPT

## 2024-02-16 PROCEDURE — 82947 ASSAY GLUCOSE BLOOD QUANT: CPT

## 2024-02-16 PROCEDURE — 82803 BLOOD GASES ANY COMBINATION: CPT

## 2024-02-16 PROCEDURE — 82330 ASSAY OF CALCIUM: CPT

## 2024-02-16 PROCEDURE — 85025 COMPLETE CBC W/AUTO DIFF WBC: CPT

## 2024-02-16 PROCEDURE — 85014 HEMATOCRIT: CPT

## 2024-02-16 PROCEDURE — 84484 ASSAY OF TROPONIN QUANT: CPT

## 2024-02-16 PROCEDURE — T1013: CPT

## 2024-02-16 PROCEDURE — 93005 ELECTROCARDIOGRAM TRACING: CPT

## 2024-02-16 PROCEDURE — 80053 COMPREHEN METABOLIC PANEL: CPT

## 2024-02-16 PROCEDURE — 99284 EMERGENCY DEPT VISIT MOD MDM: CPT

## 2024-02-16 PROCEDURE — 84132 ASSAY OF SERUM POTASSIUM: CPT

## 2024-02-16 PROCEDURE — 82435 ASSAY OF BLOOD CHLORIDE: CPT

## 2024-02-16 PROCEDURE — 99284 EMERGENCY DEPT VISIT MOD MDM: CPT | Mod: 25

## 2024-02-16 RX ORDER — SUCRALFATE 1 G
10 TABLET ORAL
Qty: 280 | Refills: 0
Start: 2024-02-16 | End: 2024-02-22

## 2024-02-16 RX ORDER — FAMOTIDINE 10 MG/ML
20 INJECTION INTRAVENOUS DAILY
Refills: 0 | Status: DISCONTINUED | OUTPATIENT
Start: 2024-02-16 | End: 2024-02-19

## 2024-02-16 RX ORDER — SUCRALFATE 1 G
1 TABLET ORAL ONCE
Refills: 0 | Status: COMPLETED | OUTPATIENT
Start: 2024-02-16 | End: 2024-02-16

## 2024-02-16 RX ADMIN — Medication 1 GRAM(S): at 05:03

## 2024-02-16 RX ADMIN — FAMOTIDINE 20 MILLIGRAM(S): 10 INJECTION INTRAVENOUS at 04:52

## 2024-02-16 RX ADMIN — Medication 30 MILLILITER(S): at 04:52

## 2024-02-16 NOTE — ED ADULT NURSE NOTE - OBJECTIVE STATEMENT
Pt is a 22 y/o female with PMH HLD presenting to the ED c/o upper chest discomfort. Pt states it feels like "something is stuck in my chest" x4 days with dry cough, sensation is intermittent without triggers. Pt denies current back pain, SOB, dizziness, n/v, abdominal pain, fever, chills.

## 2024-02-16 NOTE — ED ADULT TRIAGE NOTE - CHIEF COMPLAINT QUOTE
Intermittent chest pain r/t back a/w throat pain x last week.   Unable to tolerate PO.   Recent evaluation at OSH. Returning for similar s/s.

## 2024-02-16 NOTE — ED PROVIDER NOTE - OBJECTIVE STATEMENT
jolanta attending- 23 year old woman who presents to the ER co pain with swallowing. Currently she has no pain. She denies fever, cough, leg swelling, hx of PE and DVT.  No recent surgery.  No hormonal use. Denies recent trauma, fevers, chills, headache, dizziness, nausea, vomiting, dysuria, freq, hematuria, diarrhea, constipation, chest pain, shortness of breath, cough. seen and eval recently with reassuring moses. has gi appt in march

## 2024-02-16 NOTE — ED PROVIDER NOTE - PROGRESS NOTE DETAILS
[facility] ED Attending- Patient feels well, tolerating PO. Discussed lab and radiology findings with patient. Patient feels comfortable going home. Gave home care and follow up instructions. Discussed which symptoms to look out for and when to return to the ED for further evaluation. Patient given opportunity to ask questions about their medical condition and had all questions answered.

## 2024-02-16 NOTE — ED PROVIDER NOTE - ATTENDING CONTRIBUTION TO CARE
I, Luis Angel Underwood, performed a history and physical exam of the patient and discussed their management with the resident and/or advanced care provider. I reviewed the resident and/or advanced care provider's note and agree with the documented findings and plan of care. I was present and available for all procedures.    See my full note for details

## 2024-02-16 NOTE — ED PROVIDER NOTE - PHYSICAL EXAMINATION
Well appearing and in NAD, head normal appearing atraumatic, trachea midline, no respiratory distress, lungs cta bilaterally, rrr no murmurs, soft NT ND abdomen, no visible extremity deformities, Alert and oriented, non focal neuro exam, skin warm and dry, normal affect and mood, no leg swelling, calf ttp or jvd    clear oropharynx

## 2024-02-16 NOTE — ED PROVIDER NOTE - CLINICAL SUMMARY MEDICAL DECISION MAKING FREE TEXT BOX
pettet attending- Patient presenting with painful swallowing and normal reassuring workup just a few days ago unlikely RPA tracheitis esophagitis possibly GERD related symptoms unlikely ACS PE pneumothorax dissection AAA pneumonia needs evaluation from GI outpatient otherwise screening labs while here discussed patient agreed with plan

## 2024-02-16 NOTE — ED PROVIDER NOTE - INTERPRETER NAME
From: Vladimir Garcia  To:  Jerica Stone MD  Sent: 10/3/2018 4:28 PM CDT  Subject: Other    I'm done with Flu vaccine at the hospital.   Thanks
[employer]

## 2024-02-16 NOTE — ED PROVIDER NOTE - NSFOLLOWUPINSTRUCTIONS_ED_ALL_ED_FT
1. TAKE ALL MEDICATIONS AS DIRECTED.    2. FOR PAIN OR FEVER YOU CAN TAKE IBUPROFEN (MOTRIN, ADVIL) 600mg every 6 hours OR ACETAMINOPHEN (TYLENOL) 975mg every 6 hours AS NEEDED, AS DIRECTED ON PACKAGING.  3. FOLLOW UP WITH YOUR GI WITHIN AS DIRECTED.  4. IF YOU HAD LABS OR IMAGING DONE, YOU WERE GIVEN COPIES OF ALL LABS AND/OR IMAGING RESULTS FROM YOUR ER VISIT--PLEASE TAKE THEM WITH YOU TO YOUR FOLLOW UP APPOINTMENTS.   To obtain a copy of your medical records or disc, please contact medical records during the hours of operation (Monday - Friday 8am - 7pm; Saturday 8am - 4pm) at Phone: [phone]   5. RETURN TO THE ER FOR ANY WORSENING SYMPTOMS OR CONCERNS.

## 2024-02-16 NOTE — ED PROVIDER NOTE - NSPTACCESSSVCSAPPTDETAILS_ED_ALL_ED_FT
Has appointment with GI but too far away needs evaluation for reflux disease second visit to ER in 1 week

## 2024-02-16 NOTE — ED PROVIDER NOTE - PATIENT PORTAL LINK FT
You can access the FollowMyHealth Patient Portal offered by [facility] by registering at the following website: [email or web address]. By joining [employer]’s FollowMyHealth portal, you will also be able to view your health information using other applications (apps) compatible with our system.

## 2024-02-19 ENCOUNTER — RX RENEWAL (OUTPATIENT)
Age: 24
End: 2024-02-19

## 2024-02-19 NOTE — HISTORY OF PRESENT ILLNESS
[FreeTextEntry1] : follow up [de-identified] : 22 yo F pmh right breast biopsy presents for follow up Accompanied by  Patient recently seen at [place] ED 2/11/24 due to SOB and chest pain. Cardiac testing was negative. Symptoms likely due to  GERD. Of note, patient has abnormal EKG and had a non-ischemic stress test April 2023.  Patient states that she has a known abnormal EKG since she was living in [facility].  HLD - f/u lipid panel and CMP - ncontinue fenofibrate 134 mg QD - Advised on lifestyle modifications such as increasing exercise, cardio at least 150 mins per week and dietary changes.  chewing tobacco use -extensively counseled on adverse effects of tobacco/vaping such as malignancies. Patient reports she is ready to quit using tobacco. Will work on tapering tobacco use. Declines smoking cessation medication

## 2024-02-20 ENCOUNTER — APPOINTMENT (OUTPATIENT)
Dept: GASTROENTEROLOGY | Facility: CLINIC | Age: 24
End: 2024-02-20
Payer: COMMERCIAL

## 2024-02-20 VITALS
WEIGHT: 140 LBS | SYSTOLIC BLOOD PRESSURE: 110 MMHG | DIASTOLIC BLOOD PRESSURE: 70 MMHG | HEIGHT: 64 IN | OXYGEN SATURATION: 98 % | TEMPERATURE: 97.3 F | HEART RATE: 80 BPM | BODY MASS INDEX: 23.9 KG/M2

## 2024-02-20 PROCEDURE — 99214 OFFICE O/P EST MOD 30 MIN: CPT

## 2024-02-20 RX ORDER — PANTOPRAZOLE 40 MG/1
40 TABLET, DELAYED RELEASE ORAL
Qty: 1 | Refills: 3 | Status: ACTIVE | COMMUNITY
Start: 2024-02-20 | End: 1900-01-01

## 2024-02-20 NOTE — ASSESSMENT
[FreeTextEntry1] : Impression: Discomfort and dysphagia probably secondary to pill esophagitis, possible functional dyspepsia with exacerbation of GERD.  Plan: Reduce pantoprazole to every morning.  Continue sucralfate suspension for now.  Schedule EGD with biopsy for further evaluation.

## 2024-02-20 NOTE — HISTORY OF PRESENT ILLNESS
[FreeTextEntry1] : 23-year-old female with a history of GERD had been on omeprazole daily suddenly developed chest and throat discomfort with difficulty swallowing shortly after taking a cholesterol medication in the evening.  Went to ER and was prescribed pantoprazole 40 mg twice daily.  Symptoms persisted and several days later went back to ER and was prescribed sucralfate suspension 4 times daily in addition.  Pantoprazole seems to be causing some diarrhea.  Symptoms appear to be improving slowly.  Never had EGD.

## 2024-02-21 ENCOUNTER — APPOINTMENT (OUTPATIENT)
Dept: GASTROENTEROLOGY | Facility: AMBULATORY MEDICAL SERVICES | Age: 24
End: 2024-02-21
Payer: COMMERCIAL

## 2024-02-21 PROCEDURE — 43239 EGD BIOPSY SINGLE/MULTIPLE: CPT

## 2024-02-22 ENCOUNTER — RX RENEWAL (OUTPATIENT)
Age: 24
End: 2024-02-22

## 2024-02-22 ENCOUNTER — APPOINTMENT (OUTPATIENT)
Dept: INTERNAL MEDICINE | Facility: CLINIC | Age: 24
End: 2024-02-22

## 2024-02-23 ENCOUNTER — APPOINTMENT (OUTPATIENT)
Dept: GASTROENTEROLOGY | Facility: CLINIC | Age: 24
End: 2024-02-23

## 2024-02-27 ENCOUNTER — NON-APPOINTMENT (OUTPATIENT)
Age: 24
End: 2024-02-27

## 2024-02-27 ENCOUNTER — APPOINTMENT (OUTPATIENT)
Dept: INTERNAL MEDICINE | Facility: CLINIC | Age: 24
End: 2024-02-27
Payer: COMMERCIAL

## 2024-02-27 ENCOUNTER — APPOINTMENT (OUTPATIENT)
Dept: PAIN MANAGEMENT | Facility: CLINIC | Age: 24
End: 2024-02-27

## 2024-02-27 VITALS
RESPIRATION RATE: 15 BRPM | TEMPERATURE: 98.4 F | HEART RATE: 90 BPM | BODY MASS INDEX: 24.41 KG/M2 | SYSTOLIC BLOOD PRESSURE: 110 MMHG | DIASTOLIC BLOOD PRESSURE: 75 MMHG | OXYGEN SATURATION: 98 % | HEIGHT: 64 IN | WEIGHT: 143 LBS

## 2024-02-27 DIAGNOSIS — Z72.0 TOBACCO USE: ICD-10-CM

## 2024-02-27 DIAGNOSIS — Z00.00 ENCOUNTER FOR GENERAL ADULT MEDICAL EXAMINATION W/OUT ABNORMAL FINDINGS: ICD-10-CM

## 2024-02-27 PROCEDURE — 99395 PREV VISIT EST AGE 18-39: CPT | Mod: 25

## 2024-02-27 PROCEDURE — 99214 OFFICE O/P EST MOD 30 MIN: CPT | Mod: 25

## 2024-02-27 RX ORDER — FENOFIBRATE 134 MG/1
134 CAPSULE ORAL
Qty: 90 | Refills: 0 | Status: DISCONTINUED | COMMUNITY
End: 2024-02-27

## 2024-02-27 RX ORDER — METRONIDAZOLE 500 MG/1
500 TABLET ORAL TWICE DAILY
Qty: 14 | Refills: 0 | Status: DISCONTINUED | COMMUNITY
Start: 2023-10-20 | End: 2024-02-27

## 2024-02-27 RX ORDER — NORGESTIMATE AND ETHINYL ESTRADIOL 7DAYSX3 LO
0.18/0.215/0.25 KIT ORAL
Qty: 84 | Refills: 0 | Status: DISCONTINUED | COMMUNITY
End: 2024-02-27

## 2024-02-27 RX ORDER — SUCRALFATE 1 G/10ML
SUSPENSION ORAL
Refills: 0 | Status: DISCONTINUED | COMMUNITY

## 2024-02-27 RX ORDER — NITROFURANTOIN (MONOHYDRATE/MACROCRYSTALS) 25; 75 MG/1; MG/1
100 CAPSULE ORAL
Qty: 14 | Refills: 0 | Status: DISCONTINUED | COMMUNITY
Start: 2023-10-23 | End: 2024-02-27

## 2024-02-27 RX ORDER — NORETHINDRONE ACETATE AND ETHINYL ESTRADIOL 1; 20 MG/1; UG/1
1-20 TABLET ORAL DAILY
Qty: 21 | Refills: 3 | Status: DISCONTINUED | COMMUNITY
Start: 2023-02-06 | End: 2024-02-27

## 2024-02-27 NOTE — PLAN
[FreeTextEntry1] : Health Care Maintenance - routine labs, follow up results -- bloodwork performed in office - UTD influenza vaccine  - Pap Feb 2023, Dr Rice - depression screen negative - recommend annual skin cancer screening with Dermatologist - recommended annual eye exam with Ophthalmologist - recommended annual dental exam - h/o  right breast biopsy, HLD, tobacco abuse - continue lifestyle modifications - CPE in 1 year or sooner visit as needed  HLD - f/u lipid panel and CMP - continue fenofibrate 134 mg QD - Advised on lifestyle modifications such as increasing exercise, cardio at least 150 mins per week and dietary changes.  chewing tobacco use -extensively counseled on adverse effects of tobacco/vaping such as malignancies. Patient reports she is ready to quit using tobacco. Will work on tapering tobacco use. Will trial with nicotine gum  Low blood pressure - blood pressure at baseline today - patient reports poor hydration. Advised to increase hydration  - f/u labs   Seasonal allergies - requesting refill of allegra to be sent to pharmacy  Dyphagia, GERD - advised to avoid spicy foods, caffeine, tomatoes, laying down quickly after eating - continue PPI - elevate head of bed - gastro follow up

## 2024-02-27 NOTE — PHYSICAL EXAM
[No Acute Distress] : no acute distress [Well-Appearing] : well-appearing [Normal Sclera/Conjunctiva] : normal sclera/conjunctiva [PERRL] : pupils equal round and reactive to light [Normal Outer Ear/Nose] : the outer ears and nose were normal in appearance [EOMI] : extraocular movements intact [Normal Oropharynx] : the oropharynx was normal [Normal TMs] : both tympanic membranes were normal [Supple] : supple [No Accessory Muscle Use] : no accessory muscle use [No Respiratory Distress] : no respiratory distress  [Clear to Auscultation] : lungs were clear to auscultation bilaterally [Normal Rate] : normal rate  [Regular Rhythm] : with a regular rhythm [Normal S1, S2] : normal S1 and S2 [No Murmur] : no murmur heard [Soft] : abdomen soft [No Edema] : there was no peripheral edema [Non Tender] : non-tender [Non-distended] : non-distended [Normal Bowel Sounds] : normal bowel sounds [Grossly Normal Strength/Tone] : grossly normal strength/tone [No Focal Deficits] : no focal deficits [Speech Grossly Normal] : speech grossly normal [Alert and Oriented x3] : oriented to person, place, and time

## 2024-02-27 NOTE — HISTORY OF PRESENT ILLNESS
[FreeTextEntry1] :  CPE and post hospital follow up [de-identified] : 24 yo F pmh right breast biopsy, HLD, tobacco abuse presents for CPE and post hospital follow up Accompanied by  and son.  providing English translation as patient speaks Estonian Had two ED visits Feb 2024. Initial visit due to SOB and chest pain. She had labs, EKG and CXR performed which were negative. Symptoms improved with pepcid, Maalox and she was advised to follow up with gastro. Following ED visit, she had pain with swallowing. Testing negative. LFTs slightly increased. Symptoms deemed likely 2/2 GERD. Prescribed sucralfate suspension 4 times daily Advised to follow up with gastro. She was seen by gastro Dr Odell post discharge. Symptoms possibly 2/2 pill esophagitis, possible functional dyspepsia with exacerbation of GERD. Advised to take PPI in morning and she had EGD last week and is pending results.  Patient states she is feeling much better.  Patient reports covid infection 3 weeks prior. Had fevers and elevated HR. Symptoms have since resolved.  Reports feeling stressed about health. Sister in law passed away at age 36 from ectopic pregnancy  Reports fluctuating blood pressure readings at home, SBP 90s-110s, DBP 50s-70s. Admits to poor hydration Hx of cold sores. No symptoms currently.

## 2024-02-27 NOTE — REVIEW OF SYSTEMS
[Fever] : no fever [Chills] : no chills [Fatigue] : no fatigue [Pain] : no pain [Vision Problems] : no vision problems [Nasal Discharge] : no nasal discharge [Hearing Loss] : no hearing loss [Chest Pain] : no chest pain [Palpitations] : no palpitations [Lower Ext Edema] : no lower extremity edema [Shortness Of Breath] : no shortness of breath [Wheezing] : no wheezing [Cough] : no cough [Dyspnea on Exertion] : no dyspnea on exertion [Abdominal Pain] : no abdominal pain [Nausea] : no nausea [Constipation] : no constipation [Diarrhea] : diarrhea [Vomiting] : no vomiting [Heartburn] : no heartburn [Melena] : no melena [Dysuria] : no dysuria [Incontinence] : no incontinence [Hematuria] : no hematuria [Muscle Weakness] : no muscle weakness [Muscle Pain] : no muscle pain [Skin Rash] : no skin rash [Headache] : no headache [Dizziness] : no dizziness [Suicidal] : not suicidal [Fainting] : no fainting [Depression] : no depression

## 2024-02-27 NOTE — HEALTH RISK ASSESSMENT
[No] : No [PHQ-2 Negative - No further assessment needed] : PHQ-2 Negative - No further assessment needed [0] : 2) Feeling down, depressed, or hopeless: Not at all (0) [With Family] : lives with family [None] : None [Unemployed] : unemployed [] :  [# Of Children ___] : has [unfilled] children [Feels Safe at Home] : Feels safe at home [Fully functional (using the telephone, shopping, preparing meals, housekeeping, doing laundry, using] : Fully functional and needs no help or supervision to perform IADLs (using the telephone, shopping, preparing meals, housekeeping, doing laundry, using transportation, managing medications and managing finances) [Fully functional (bathing, dressing, toileting, transferring, walking, feeding)] : Fully functional (bathing, dressing, toileting, transferring, walking, feeding) [Smoke Detector] : smoke detector [Carbon Monoxide Detector] : carbon monoxide detector [Current] : Current [[id number]] : 0 [Change in mental status noted] : No change in mental status noted [Language] : denies difficulty with language [Handling Complex Tasks] : denies difficulty handling complex tasks [Reports changes in hearing] : Reports no changes in hearing [Reports changes in dental health] : Reports no changes in dental health [Reports changes in vision] : Reports no changes in vision [PapSmearDate] : 02/23 [de-identified] : with  and son [FreeTextEntry3] : 18 month old son [de-identified] : chewing tobacco

## 2024-02-29 LAB
ALBUMIN SERPL ELPH-MCNC: 4.6 G/DL
ALP BLD-CCNC: 71 U/L
ALT SERPL-CCNC: 54 U/L
ANION GAP SERPL CALC-SCNC: 12 MMOL/L
AST SERPL-CCNC: 34 U/L
BILIRUB SERPL-MCNC: 0.5 MG/DL
BUN SERPL-MCNC: 7 MG/DL
CALCIUM SERPL-MCNC: 9.7 MG/DL
CHLORIDE SERPL-SCNC: 103 MMOL/L
CHOLEST SERPL-MCNC: 229 MG/DL
CO2 SERPL-SCNC: 23 MMOL/L
CREAT SERPL-MCNC: 0.78 MG/DL
EGFR: 108 ML/MIN/1.73M2
ESTIMATED AVERAGE GLUCOSE: 100 MG/DL
GLUCOSE SERPL-MCNC: 89 MG/DL
HBA1C MFR BLD HPLC: 5.1 %
HCT VFR BLD CALC: 40 %
HDLC SERPL-MCNC: 29 MG/DL
HGB BLD-MCNC: 12.8 G/DL
LDLC SERPL CALC-MCNC: 121 MG/DL
MCHC RBC-ENTMCNC: 27.8 PG
MCHC RBC-ENTMCNC: 32 GM/DL
MCV RBC AUTO: 87 FL
NONHDLC SERPL-MCNC: 200 MG/DL
PLATELET # BLD AUTO: 260 K/UL
POTASSIUM SERPL-SCNC: 4.3 MMOL/L
PROT SERPL-MCNC: 7.7 G/DL
RBC # BLD: 4.6 M/UL
RBC # FLD: 12.4 %
SODIUM SERPL-SCNC: 138 MMOL/L
TRIGL SERPL-MCNC: 444 MG/DL
TSH SERPL-ACNC: 1.67 UIU/ML
WBC # FLD AUTO: 6.26 K/UL

## 2024-03-06 ENCOUNTER — APPOINTMENT (OUTPATIENT)
Dept: PULMONOLOGY | Facility: CLINIC | Age: 24
End: 2024-03-06
Payer: COMMERCIAL

## 2024-03-06 VITALS
HEIGHT: 63 IN | DIASTOLIC BLOOD PRESSURE: 77 MMHG | OXYGEN SATURATION: 99 % | HEART RATE: 83 BPM | BODY MASS INDEX: 24.98 KG/M2 | SYSTOLIC BLOOD PRESSURE: 111 MMHG | WEIGHT: 141 LBS

## 2024-03-06 PROCEDURE — 94060 EVALUATION OF WHEEZING: CPT

## 2024-03-06 PROCEDURE — 94729 DIFFUSING CAPACITY: CPT

## 2024-03-06 PROCEDURE — ZZZZZ: CPT

## 2024-03-06 PROCEDURE — 94726 PLETHYSMOGRAPHY LUNG VOLUMES: CPT

## 2024-03-06 PROCEDURE — 99204 OFFICE O/P NEW MOD 45 MIN: CPT | Mod: 25

## 2024-03-06 NOTE — ASSESSMENT
[FreeTextEntry1] : 1. Dyspnea: CXR, physical exam, PFTs normal. No evidence intrinsic lung disease. Could be GERD related, discussed GERD avoidance techniques. Also could be related to underlying anxiety. Will see again prn.

## 2024-03-06 NOTE — HISTORY OF PRESENT ILLNESS
[TextBox_4] : patient notes about 1 year of palpitations and dyspnea when lying down to sleep at night. This has been worsening over the past year, although symptoms are intermittent. When they occur, they are usually self limitied, patient tosses and turns until she is able to go to sleep. No associated cough or wheeze. No chest pain.  No hx of pulmonary disease, non smoker. patient states she has seen a cardiologist who has not found anything wrong as of yet. She notes occasional associated numbness in extremities during these episodes. She does admit to anxiety, although no formal diagnosis or treatment.  She has severe GERD and takes suclafrate and pantoprazole.

## 2024-03-07 ENCOUNTER — APPOINTMENT (OUTPATIENT)
Dept: CARDIOLOGY | Facility: CLINIC | Age: 24
End: 2024-03-07

## 2024-03-07 NOTE — DISCUSSION/SUMMARY
[FreeTextEntry1] : The patient is a 23-year-old female with atypical chest pain, dyspnea and an abnormal EKG. #1 CV- deeply inverted T waves in the anterior and inferior leads, recommend complete echo done in cardiology and exercise stress test #2 General-she does have an infant and some of her symptoms may be related to the care of the baby.  All questions answered.

## 2024-03-09 PROBLEM — Z78.9 NON-SMOKER: Status: ACTIVE | Noted: 2023-01-23

## 2024-03-09 PROBLEM — R12 HEARTBURN: Status: ACTIVE | Noted: 2023-02-16

## 2024-03-09 PROBLEM — R06.00 DYSPNEA AND RESPIRATORY ABNORMALITY: Status: ACTIVE | Noted: 2024-03-06

## 2024-03-09 PROBLEM — R39.9 UTI SYMPTOMS: Status: RESOLVED | Noted: 2023-10-23 | Resolved: 2024-01-22

## 2024-03-09 PROBLEM — Z83.3 FAMILY HISTORY OF DIABETES MELLITUS: Status: ACTIVE | Noted: 2023-01-23

## 2024-03-09 PROBLEM — R10.13 DYSPEPSIA: Status: ACTIVE | Noted: 2024-02-20

## 2024-03-09 PROBLEM — Z83.79 FAMILY HISTORY OF HEPATIC CIRRHOSIS: Status: ACTIVE | Noted: 2023-01-23

## 2024-03-09 PROBLEM — J30.2 SEASONAL ALLERGIES: Status: ACTIVE | Noted: 2024-02-27

## 2024-03-09 NOTE — PHYSICAL EXAM
[Normal Oropharynx] : normal oropharynx [No Acute Distress] : no acute distress [Normal Appearance] : normal appearance [Normal Rate/Rhythm] : normal rate/rhythm [No Neck Mass] : no neck mass [No Murmurs] : no murmurs [Normal S1, S2] : normal s1, s2 [No Resp Distress] : no resp distress [Clear to Auscultation Bilaterally] : clear to auscultation bilaterally [No Abnormalities] : no abnormalities [Benign] : benign [No Clubbing] : no clubbing [Normal Gait] : normal gait [No Edema] : no edema [No Cyanosis] : no cyanosis [FROM] : FROM [Normal Color/ Pigmentation] : normal color/ pigmentation [Oriented x3] : oriented x3 [No Focal Deficits] : no focal deficits [Normal Affect] : normal affect

## 2024-03-09 NOTE — HISTORY OF PRESENT ILLNESS
[Never] : never [TextBox_4] : Patient is a 23-year-old female past medical history significant for anxiety depression history of gastroesophageal reflux disorder who presents for pulmonary consult.  Patient complains of occasional shortness of breath and dyspnea.  She has had a recent normal chest x-ray.  She is a non-smoker.

## 2024-03-09 NOTE — ASSESSMENT
[FreeTextEntry1] : In summary the patient is a 23-year-old female past medical history significant for anxiety and depression gastroesophageal reflux disorder who presents for pulmonary consult.  The patient physical exam is within normal limits.  There is no current evidence of lung disease.  A PFT has been ordered.  Her symptoms may be secondary to reflux disorder.  Patient is instructed to follow-up on a as needed basis.

## 2024-03-13 ENCOUNTER — APPOINTMENT (OUTPATIENT)
Dept: CARDIOLOGY | Facility: CLINIC | Age: 24
End: 2024-03-13
Payer: COMMERCIAL

## 2024-03-13 ENCOUNTER — NON-APPOINTMENT (OUTPATIENT)
Age: 24
End: 2024-03-13

## 2024-03-13 VITALS
BODY MASS INDEX: 24.98 KG/M2 | OXYGEN SATURATION: 100 % | SYSTOLIC BLOOD PRESSURE: 110 MMHG | HEART RATE: 75 BPM | WEIGHT: 141 LBS | DIASTOLIC BLOOD PRESSURE: 78 MMHG

## 2024-03-13 DIAGNOSIS — E78.00 PURE HYPERCHOLESTEROLEMIA, UNSPECIFIED: ICD-10-CM

## 2024-03-13 PROCEDURE — G2211 COMPLEX E/M VISIT ADD ON: CPT

## 2024-03-13 PROCEDURE — 93000 ELECTROCARDIOGRAM COMPLETE: CPT

## 2024-03-13 PROCEDURE — 99205 OFFICE O/P NEW HI 60 MIN: CPT

## 2024-03-13 RX ORDER — ATORVASTATIN CALCIUM 20 MG/1
20 TABLET, FILM COATED ORAL
Qty: 90 | Refills: 1 | Status: ACTIVE | COMMUNITY
Start: 2024-03-13 | End: 1900-01-01

## 2024-03-13 NOTE — HISTORY OF PRESENT ILLNESS
[FreeTextEntry1] : Dear Mary,   I had the pleasure of seeing your patient JAZLYN CHURCH for Cardiometabolic evaluation.   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

## 2024-03-13 NOTE — DISCUSSION/SUMMARY
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   With kind thanks for the referral.   Negrito Singh MD [facility] JULIO GARG Director, Preventive Cardiology & Lipidology [facility]                                                                                                                                                                                                                                                                                                                                                                                                                                                                                                                                                                                                                                                                                                                           60 minutes spent in patient encounter explaining and formulating rationale for treatment plan. >50% of time spent in direct counseling reviewing all tests, labs, and imaging and conferring with patient, family member, and other physicians regarding patient care >50% of time spent in direct counseling reviewing all tests, labs, and imaging and conferring with patient, family member, and other physicians regarding patient care

## 2024-03-13 NOTE — PHYSICAL EXAM
[Well Developed] : well developed [Well Nourished] : well nourished [Normal Conjunctiva] : normal conjunctiva [No Acute Distress] : no acute distress [Normal Venous Pressure] : normal venous pressure [No Carotid Bruit] : no carotid bruit [Normal S1, S2] : normal S1, S2 [No Murmur] : no murmur [No Rub] : no rub [No Gallop] : no gallop [No Respiratory Distress] : no respiratory distress  [Good Air Entry] : good air entry [Clear Lung Fields] : clear lung fields [Non Tender] : non-tender [Soft] : abdomen soft [No Masses/organomegaly] : no masses/organomegaly [Normal Bowel Sounds] : normal bowel sounds [Normal Gait] : normal gait [No Cyanosis] : no cyanosis [No Edema] : no edema [No Clubbing] : no clubbing [No Varicosities] : no varicosities [No Rash] : no rash [No Skin Lesions] : no skin lesions [Moves all extremities] : moves all extremities [No Focal Deficits] : no focal deficits [Normal Speech] : normal speech [Alert and Oriented] : alert and oriented [Normal memory] : normal memory

## 2024-03-15 LAB
ALBUMIN SERPL ELPH-MCNC: 4.6 G/DL
ALP BLD-CCNC: 62 U/L
ALT SERPL-CCNC: 34 U/L
ANION GAP SERPL CALC-SCNC: 11 MMOL/L
AST SERPL-CCNC: 27 U/L
BILIRUB SERPL-MCNC: 0.5 MG/DL
BUN SERPL-MCNC: 6 MG/DL
CALCIUM SERPL-MCNC: 9.7 MG/DL
CHLORIDE SERPL-SCNC: 104 MMOL/L
CHOLEST SERPL-MCNC: 210 MG/DL
CO2 SERPL-SCNC: 23 MMOL/L
CREAT SERPL-MCNC: 0.86 MG/DL
EGFR: 96 ML/MIN/1.73M2
ESTIMATED AVERAGE GLUCOSE: 100 MG/DL
GLUCOSE SERPL-MCNC: 90 MG/DL
HBA1C MFR BLD HPLC: 5.1 %
HCT VFR BLD CALC: 37.6 %
HDLC SERPL-MCNC: 30 MG/DL
HGB BLD-MCNC: 12.4 G/DL
LDLC SERPL CALC-MCNC: 139 MG/DL
MCHC RBC-ENTMCNC: 28.6 PG
MCHC RBC-ENTMCNC: 33 GM/DL
MCV RBC AUTO: 86.6 FL
NONHDLC SERPL-MCNC: 180 MG/DL
PLATELET # BLD AUTO: 248 K/UL
POTASSIUM SERPL-SCNC: 4.1 MMOL/L
PROT SERPL-MCNC: 7.2 G/DL
RBC # BLD: 4.34 M/UL
RBC # FLD: 12.4 %
SODIUM SERPL-SCNC: 138 MMOL/L
TRIGL SERPL-MCNC: 229 MG/DL
TSH SERPL-ACNC: 1.29 UIU/ML
WBC # FLD AUTO: 6.41 K/UL

## 2024-03-17 LAB — APO LP(A) SERPL-MCNC: 62.4 NMOL/L

## 2024-03-21 ENCOUNTER — NON-APPOINTMENT (OUTPATIENT)
Age: 24
End: 2024-03-21

## 2024-03-21 RX ORDER — PANTOPRAZOLE 40 MG/1
40 TABLET, DELAYED RELEASE ORAL
Qty: 1 | Refills: 3 | Status: ACTIVE | COMMUNITY
Start: 2024-02-21 | End: 1900-01-01

## 2024-03-28 ENCOUNTER — NON-APPOINTMENT (OUTPATIENT)
Age: 24
End: 2024-03-28

## 2024-03-28 RX ORDER — DICYCLOMINE HYDROCHLORIDE 20 MG/1
20 TABLET ORAL
Qty: 90 | Refills: 5 | Status: ACTIVE | COMMUNITY
Start: 2024-03-28 | End: 1900-01-01

## 2024-03-28 RX ORDER — AMITRIPTYLINE HYDROCHLORIDE 10 MG/1
10 TABLET, FILM COATED ORAL
Qty: 60 | Refills: 5 | Status: ACTIVE | COMMUNITY
Start: 2024-03-28 | End: 1900-01-01

## 2024-04-17 ENCOUNTER — APPOINTMENT (OUTPATIENT)
Dept: CARDIOLOGY | Facility: CLINIC | Age: 24
End: 2024-04-17

## 2024-04-23 ENCOUNTER — APPOINTMENT (OUTPATIENT)
Dept: PAIN MANAGEMENT | Facility: CLINIC | Age: 24
End: 2024-04-23
Payer: COMMERCIAL

## 2024-04-23 VITALS
BODY MASS INDEX: 24.1 KG/M2 | DIASTOLIC BLOOD PRESSURE: 87 MMHG | HEIGHT: 63 IN | HEART RATE: 98 BPM | WEIGHT: 136 LBS | SYSTOLIC BLOOD PRESSURE: 119 MMHG

## 2024-04-23 PROCEDURE — 99213 OFFICE O/P EST LOW 20 MIN: CPT

## 2024-04-23 RX ORDER — TOPIRAMATE 25 MG/1
25 TABLET, FILM COATED ORAL
Qty: 120 | Refills: 2 | Status: ACTIVE | COMMUNITY
Start: 2024-04-23 | End: 1900-01-01

## 2024-04-23 RX ORDER — ELETRIPTAN HYDROBROMIDE 40 MG/1
40 TABLET, FILM COATED ORAL
Qty: 12 | Refills: 2 | Status: ACTIVE | COMMUNITY
Start: 2024-04-23 | End: 1900-01-01

## 2024-04-23 NOTE — ASSESSMENT
[FreeTextEntry1] : Migraine with dizziness - discussed D/C rizatriptan and trial of Eletriptan  Discussed trial of Topamax - pt made aware not to become pregnant.  RTO 3 months

## 2024-04-23 NOTE — PHYSICAL EXAM
[General Appearance - Alert] : alert [General Appearance - In No Acute Distress] : in no acute distress [General Appearance - Well Nourished] : well nourished [General Appearance - Well Developed] : well developed [General Appearance - Well-Appearing] : healthy appearing [Oriented To Time, Place, And Person] : oriented to person, place, and time [Affect] : the affect was normal [Mood] : the mood was normal [Cranial Nerves Facial (VII)] : face symmetrical [Cranial Nerves Accessory (XI - Cranial And Spinal)] : head turning and shoulder shrug symmetric [Cranial Nerves Hypoglossal (XII)] : there was no tongue deviation with protrusion [Motor Strength] : muscle strength was normal in all four extremities [Involuntary Movements] : no involuntary movements were seen [No Muscle Atrophy] : normal bulk in all four extremities [Paresis Pronator Drift Right-Sided] : no pronator drift on the right [Paresis Pronator Drift Left-Sided] : no pronator drift on the left [Motor Strength Upper Extremities Bilaterally] : strength was normal in both upper extremities [Motor Strength Lower Extremities Bilaterally] : strength was normal in both lower extremities [Romberg's Sign] : Romberg's sign was negtive [Coordination - Dysmetria Impaired Finger-to-Nose Bilateral] : not present [2+] : Brachioradialis left 2+ [Sclera] : the sclera and conjunctiva were normal [PERRL With Normal Accommodation] : pupils were equal in size, round, reactive to light, with normal accommodation [Extraocular Movements] : extraocular movements were intact [] : no respiratory distress [Abnormal Walk] : normal gait

## 2024-04-23 NOTE — HISTORY OF PRESENT ILLNESS
[FreeTextEntry1] : Returns today for a migraine follow up appt.  Pt reports Rizatriptan is helpful but causes her  to feel tired . Migraine frequency is ~3x/ week .Last week had a migraine everyday.  Has recently been started on Amitriptyline for GI issues.  Pt reports dizziness is usually associated with migraine but not always.  Did consult with Sleep Specialist and was told she does not  have sleep apnea.   Of note she has an aunt with migraine and dizziness.   [Headache] : headache [Dizziness] : dizziness [Nausea] : no nausea [Vomiting] : no Vomiting [Photophobia] : no photophobia [Phonophobia] : no phonophobia [Neck Pain] : neck pain [Scotoma] : no scotoma [Numbness] : no numbness [Tingling] : no tingling [Weakness] : no weakness [Scalp Tenderness] : no scalp tenderness [___ Times Per Week] : [unfilled] times each week

## 2024-04-25 ENCOUNTER — APPOINTMENT (OUTPATIENT)
Dept: CARDIOLOGY | Facility: CLINIC | Age: 24
End: 2024-04-25

## 2024-05-07 PROBLEM — R94.31 ABNORMAL EKG: Status: ACTIVE | Noted: 2023-03-07

## 2024-05-08 ENCOUNTER — NON-APPOINTMENT (OUTPATIENT)
Age: 24
End: 2024-05-08

## 2024-05-08 ENCOUNTER — APPOINTMENT (OUTPATIENT)
Dept: CARDIOLOGY | Facility: CLINIC | Age: 24
End: 2024-05-08
Payer: COMMERCIAL

## 2024-05-08 VITALS
WEIGHT: 138 LBS | DIASTOLIC BLOOD PRESSURE: 77 MMHG | SYSTOLIC BLOOD PRESSURE: 101 MMHG | BODY MASS INDEX: 24.45 KG/M2 | HEART RATE: 69 BPM | OXYGEN SATURATION: 99 %

## 2024-05-08 DIAGNOSIS — R94.31 ABNORMAL ELECTROCARDIOGRAM [ECG] [EKG]: ICD-10-CM

## 2024-05-08 DIAGNOSIS — R01.1 CARDIAC MURMUR, UNSPECIFIED: ICD-10-CM

## 2024-05-08 DIAGNOSIS — R55 SYNCOPE AND COLLAPSE: ICD-10-CM

## 2024-05-08 PROCEDURE — 99215 OFFICE O/P EST HI 40 MIN: CPT

## 2024-05-08 PROCEDURE — G2211 COMPLEX E/M VISIT ADD ON: CPT

## 2024-05-08 RX ORDER — NORMAL SALT TABLETS 1 G/G
1 TABLET ORAL
Qty: 270 | Refills: 3 | Status: ACTIVE | COMMUNITY
Start: 2024-05-08 | End: 1900-01-01

## 2024-05-23 ENCOUNTER — EMERGENCY (EMERGENCY)
Facility: HOSPITAL | Age: 24
LOS: 0 days | Discharge: HOME | End: 2024-05-23
Attending: EMERGENCY MEDICINE
Payer: COMMERCIAL

## 2024-05-23 VITALS
HEART RATE: 65 BPM | SYSTOLIC BLOOD PRESSURE: 100 MMHG | RESPIRATION RATE: 18 BRPM | DIASTOLIC BLOOD PRESSURE: 64 MMHG | TEMPERATURE: 98 F | OXYGEN SATURATION: 100 %

## 2024-05-23 VITALS
OXYGEN SATURATION: 99 % | TEMPERATURE: 98 F | HEIGHT: 63 IN | SYSTOLIC BLOOD PRESSURE: 139 MMHG | WEIGHT: 138.01 LBS | HEART RATE: 58 BPM | RESPIRATION RATE: 16 BRPM | DIASTOLIC BLOOD PRESSURE: 96 MMHG

## 2024-05-23 DIAGNOSIS — K21.9 GASTRO-ESOPHAGEAL REFLUX DISEASE WITHOUT ESOPHAGITIS: ICD-10-CM

## 2024-05-23 DIAGNOSIS — R06.02 SHORTNESS OF BREATH: ICD-10-CM

## 2024-05-23 DIAGNOSIS — R07.89 OTHER CHEST PAIN: ICD-10-CM

## 2024-05-23 LAB
ALBUMIN SERPL ELPH-MCNC: 3.7 G/DL — SIGNIFICANT CHANGE UP (ref 3.3–5)
ALP SERPL-CCNC: 62 U/L — SIGNIFICANT CHANGE UP (ref 40–120)
ALT FLD-CCNC: 24 U/L — SIGNIFICANT CHANGE UP (ref 12–78)
ANION GAP SERPL CALC-SCNC: 6 MMOL/L — SIGNIFICANT CHANGE UP (ref 5–17)
AST SERPL-CCNC: 17 U/L — SIGNIFICANT CHANGE UP (ref 15–37)
BASOPHILS # BLD AUTO: 0.03 K/UL — SIGNIFICANT CHANGE UP (ref 0–0.2)
BASOPHILS NFR BLD AUTO: 0.4 % — SIGNIFICANT CHANGE UP (ref 0–2)
BILIRUB SERPL-MCNC: 0.4 MG/DL — SIGNIFICANT CHANGE UP (ref 0.2–1.2)
BUN SERPL-MCNC: 11 MG/DL — SIGNIFICANT CHANGE UP (ref 7–23)
CALCIUM SERPL-MCNC: 9.4 MG/DL — SIGNIFICANT CHANGE UP (ref 8.5–10.1)
CHLORIDE SERPL-SCNC: 107 MMOL/L — SIGNIFICANT CHANGE UP (ref 96–108)
CO2 SERPL-SCNC: 24 MMOL/L — SIGNIFICANT CHANGE UP (ref 22–31)
CREAT SERPL-MCNC: 0.85 MG/DL — SIGNIFICANT CHANGE UP (ref 0.5–1.3)
D DIMER BLD IA.RAPID-MCNC: <150 NG/ML DDU — SIGNIFICANT CHANGE UP
EGFR: 97 ML/MIN/1.73M2 — SIGNIFICANT CHANGE UP
EOSINOPHIL # BLD AUTO: 0.3 K/UL — SIGNIFICANT CHANGE UP (ref 0–0.5)
EOSINOPHIL NFR BLD AUTO: 3.8 % — SIGNIFICANT CHANGE UP (ref 0–6)
GLUCOSE SERPL-MCNC: 101 MG/DL — HIGH (ref 70–99)
HCG SERPL-ACNC: <1 MIU/ML — SIGNIFICANT CHANGE UP
HCT VFR BLD CALC: 36.5 % — SIGNIFICANT CHANGE UP (ref 34.5–45)
HGB BLD-MCNC: 11.8 G/DL — SIGNIFICANT CHANGE UP (ref 11.5–15.5)
IMM GRANULOCYTES NFR BLD AUTO: 0.3 % — SIGNIFICANT CHANGE UP (ref 0–0.9)
LYMPHOCYTES # BLD AUTO: 3.14 K/UL — SIGNIFICANT CHANGE UP (ref 1–3.3)
LYMPHOCYTES # BLD AUTO: 39.5 % — SIGNIFICANT CHANGE UP (ref 13–44)
MCHC RBC-ENTMCNC: 27.3 PG — SIGNIFICANT CHANGE UP (ref 27–34)
MCHC RBC-ENTMCNC: 32.3 G/DL — SIGNIFICANT CHANGE UP (ref 32–36)
MCV RBC AUTO: 84.5 FL — SIGNIFICANT CHANGE UP (ref 80–100)
MONOCYTES # BLD AUTO: 0.5 K/UL — SIGNIFICANT CHANGE UP (ref 0–0.9)
MONOCYTES NFR BLD AUTO: 6.3 % — SIGNIFICANT CHANGE UP (ref 2–14)
NEUTROPHILS # BLD AUTO: 3.95 K/UL — SIGNIFICANT CHANGE UP (ref 1.8–7.4)
NEUTROPHILS NFR BLD AUTO: 49.7 % — SIGNIFICANT CHANGE UP (ref 43–77)
NRBC # BLD: 0 /100 WBCS — SIGNIFICANT CHANGE UP (ref 0–0)
NT-PROBNP SERPL-SCNC: 13 PG/ML — SIGNIFICANT CHANGE UP (ref 0–125)
PLATELET # BLD AUTO: 191 K/UL — SIGNIFICANT CHANGE UP (ref 150–400)
POTASSIUM SERPL-MCNC: 3.3 MMOL/L — LOW (ref 3.5–5.3)
POTASSIUM SERPL-SCNC: 3.3 MMOL/L — LOW (ref 3.5–5.3)
PROT SERPL-MCNC: 7.6 GM/DL — SIGNIFICANT CHANGE UP (ref 6–8.3)
RBC # BLD: 4.32 M/UL — SIGNIFICANT CHANGE UP (ref 3.8–5.2)
RBC # FLD: 12.5 % — SIGNIFICANT CHANGE UP (ref 10.3–14.5)
SODIUM SERPL-SCNC: 137 MMOL/L — SIGNIFICANT CHANGE UP (ref 135–145)
TROPONIN I, HIGH SENSITIVITY RESULT: <3 NG/L — SIGNIFICANT CHANGE UP
WBC # BLD: 7.94 K/UL — SIGNIFICANT CHANGE UP (ref 3.8–10.5)
WBC # FLD AUTO: 7.94 K/UL — SIGNIFICANT CHANGE UP (ref 3.8–10.5)

## 2024-05-23 PROCEDURE — 99285 EMERGENCY DEPT VISIT HI MDM: CPT

## 2024-05-23 PROCEDURE — 93010 ELECTROCARDIOGRAM REPORT: CPT

## 2024-05-23 PROCEDURE — 71045 X-RAY EXAM CHEST 1 VIEW: CPT | Mod: 26

## 2024-05-23 RX ORDER — ACETAMINOPHEN 500 MG
975 TABLET ORAL ONCE
Refills: 0 | Status: DISCONTINUED | OUTPATIENT
Start: 2024-05-23 | End: 2024-05-23

## 2024-05-23 RX ORDER — ACETAMINOPHEN 500 MG
1000 TABLET ORAL ONCE
Refills: 0 | Status: COMPLETED | OUTPATIENT
Start: 2024-05-23 | End: 2024-05-23

## 2024-05-23 RX ADMIN — Medication 1000 MILLIGRAM(S): at 03:20

## 2024-05-23 RX ADMIN — Medication 400 MILLIGRAM(S): at 02:49

## 2024-05-23 RX ADMIN — Medication 1000 MILLIGRAM(S): at 03:05

## 2024-05-23 NOTE — ED PROVIDER NOTE - PATIENT PORTAL LINK FT
You can access the FollowMyHealth Patient Portal offered by [facility] by registering at the following website: [email or web address]. By joining [employer]’s FollowMyHealth portal, you will also be able to view your health information using other applications (apps) compatible with our system.

## 2024-05-23 NOTE — ED ADULT NURSE NOTE - OBJECTIVE STATEMENT
Pt BIBA c/o chest tightness and back pain since this AM, started after breaking fast. Worsened by eating/drinking. Pt took sucralfate in am. Denies SOB/N/V. Denies pmhx

## 2024-05-23 NOTE — ED PROVIDER NOTE - NSTIMEPROVIDERCAREINITIATE_GEN_ER
Left detailed message on Pts vm regarding instructions from Dr Guevara for Lab Draw   23-May-2024 02:19

## 2024-05-23 NOTE — ED ADULT TRIAGE NOTE - CHIEF COMPLAINT QUOTE
BIBA,  pt c/o chest pressure and back pain since this morning,  pt states "started after breaking fast" and usually worse when eating.  pt took sucralfate in am.  LMP 5/21/24

## 2024-05-23 NOTE — ED PROVIDER NOTE - PROVIDER TOKENS
PROVIDER:[TOKEN:[[id number]],FOLLOWUP:[Urgent]] PROVIDER:[TOKEN:[[id number]],FOLLOWUP:[Urgent]],PROVIDER:[TOKEN:[[id number]],FOLLOWUP:[Urgent]],PROVIDER:[TOKEN:[[id number]],FOLLOWUP:[Urgent]],PROVIDER:[TOKEN:[[id number]],FOLLOWUP:[Urgent]]

## 2024-05-23 NOTE — ED PROVIDER NOTE - CARE PLAN
1 Principal Discharge DX:	Acute chest pain  Secondary Diagnosis:	Shortness of breath   Principal Discharge DX:	Acute chest pain  Secondary Diagnosis:	Shortness of breath  Secondary Diagnosis:	Chronic GERD

## 2024-05-23 NOTE — ED PROVIDER NOTE - PROGRESS NOTE DETAILS
Results reported to patient--grossly benign, labs unremarkable, normal dimer, clear XR  Pt. reports feeling better after meds  pt. agrees to f/u with primary care outpt., GI, pulm, cardio referrals given urgently for f/u   pt. understands to return to ED if symptoms worsen; will d/c with copy of results for reference   symptoms likely 2/2 acid reflux made worse by fasting

## 2024-05-23 NOTE — ED PROVIDER NOTE - PHYSICAL EXAMINATION
Vitals: HTN at 139/96, otherwise WNL  Gen: AAOx3, NAD, sitting comfortably in stretcher, calm, non-toxic, speaking in full sentences without retractions   Head: ncat, perrla, eomi b/l  Neck: supple, no lymphadenopathy, no midline deviation  Heart: rrr, no m/r/g  Lungs: CTA b/l, no rales/ronchi/wheezes  Abd: soft, nontender, non-distended, no rebound or guarding  Ext: no clubbing/cyanosis/edema  Neuro: sensation and muscle strength intact b/l, steady gait

## 2024-05-23 NOTE — ED PROVIDER NOTE - CLINICAL SUMMARY MEDICAL DECISION MAKING FREE TEXT BOX
24 yo F with cp/sob, concerning for acs, pe, doubt pnx  -cbc ,cmp, ua/cx, dimer, hcg, bnp, trop, CXR, ekg, tylenol, monitor  -f/u results, reeval, CTA if indicated by dimer

## 2024-05-23 NOTE — ED PROVIDER NOTE - CARE PROVIDERS DIRECT ADDRESSES
[email or web address]

## 2024-05-23 NOTE — ED PROVIDER NOTE - OBJECTIVE STATEMENT
24 yo F with chest pain, sob, started this morning >12 hours ago.  Pt. denies inciting event.  Pain is over anterior chest, pressure like, radiates to anterior neck, + associated sob at rest.  Pt. took her O2 sat with monitor at home which read 75%.  No other complaints or associated symptoms.   ROS: negative for fever, cough, headache, abd pain, nausea, vomiting, diarrhea, rash, paresthesia, and weakness--all other systems reviewed are negative.   PMH: migraines, gerd, HLD; Meds: pantoprazole, sucralfate, topiramate, Nabumetone, amitriptyline, atorvastatin, dicyclomine; SH: Denies smoking/drinking/drug use

## 2024-05-23 NOTE — ED PROVIDER NOTE - CARE PROVIDER_API CALL
Poncho Monroy  Internal Medicine  [address] [place] [address]  Phone: [phone]  Fax: [phone]  Follow Up Time: Urgent   Poncho Monroy  Internal Medicine  [address] [place] [address]  Phone: [phone]  Fax: [phone]  Follow Up Time: Urgent    Harry Branch  Interventional Cardiology  [address] [place] [address]  Phone: [phone]  Fax: [phone]  Follow Up Time: Urgent    Clarke Mg  Pulmonary Disease  [address] [place] [id number]  Phone: [phone]  Fax: [phone]  Follow Up Time: Urgent    Carlton Kendrick  Gastroenterology  [address] [place] [id number]  Phone: [phone]  Fax: [phone]  Follow Up Time: Urgent

## 2024-05-28 ENCOUNTER — NON-APPOINTMENT (OUTPATIENT)
Age: 24
End: 2024-05-28

## 2024-06-03 RX ORDER — FENOFIBRATE 200 MG/1
200 CAPSULE ORAL
Qty: 90 | Refills: 0 | Status: COMPLETED | COMMUNITY
Start: 2024-02-22 | End: 2024-06-03

## 2024-06-03 RX ORDER — PUMPKIN SEED EXTRACT/SOY GERM 300 MG
600 CAPSULE ORAL
Qty: 10 | Refills: 0 | Status: COMPLETED | COMMUNITY
Start: 2023-10-20 | End: 2024-06-03

## 2024-06-03 RX ORDER — BETAMETHASONE DIPROPIONATE 0.5 MG/G
0.05 CREAM, AUGMENTED TOPICAL TWICE DAILY
Qty: 60 | Refills: 0 | Status: COMPLETED | COMMUNITY
Start: 2023-02-06 | End: 2024-06-03

## 2024-06-03 RX ORDER — SUCRALFATE 1 G/10ML
1 SUSPENSION ORAL 4 TIMES DAILY
Qty: 1200 | Refills: 3 | Status: COMPLETED | COMMUNITY
Start: 2024-02-21 | End: 2024-06-03

## 2024-06-03 RX ORDER — NICOTINE POLACRILEX 4 MG/1
4 GUM, CHEWING ORAL
Qty: 30 | Refills: 0 | Status: COMPLETED | COMMUNITY
Start: 2024-02-27 | End: 2024-06-03

## 2024-06-03 RX ORDER — METRONIDAZOLE 7.5 MG/G
0.75 GEL VAGINAL
Qty: 5 | Refills: 0 | Status: COMPLETED | COMMUNITY
Start: 2023-03-30 | End: 2024-06-03

## 2024-06-03 RX ORDER — OMEPRAZOLE 20 MG/1
20 CAPSULE, DELAYED RELEASE ORAL
Qty: 30 | Refills: 5 | Status: COMPLETED | COMMUNITY
Start: 2023-02-16 | End: 2024-06-03

## 2024-06-04 ENCOUNTER — APPOINTMENT (OUTPATIENT)
Dept: INTERNAL MEDICINE | Facility: CLINIC | Age: 24
End: 2024-06-04
Payer: COMMERCIAL

## 2024-06-04 VITALS
OXYGEN SATURATION: 98 % | HEART RATE: 96 BPM | WEIGHT: 134 LBS | DIASTOLIC BLOOD PRESSURE: 80 MMHG | TEMPERATURE: 98.1 F | BODY MASS INDEX: 23.74 KG/M2 | HEIGHT: 63 IN | SYSTOLIC BLOOD PRESSURE: 114 MMHG | RESPIRATION RATE: 15 BRPM

## 2024-06-04 DIAGNOSIS — R07.89 OTHER CHEST PAIN: ICD-10-CM

## 2024-06-04 DIAGNOSIS — R13.10 DYSPHAGIA, UNSPECIFIED: ICD-10-CM

## 2024-06-04 PROCEDURE — 99214 OFFICE O/P EST MOD 30 MIN: CPT

## 2024-06-04 RX ORDER — FEXOFENADINE HYDROCHLORIDE 180 MG/1
180 TABLET ORAL DAILY
Qty: 90 | Refills: 0 | Status: DISCONTINUED | COMMUNITY
Start: 2024-02-27 | End: 2024-06-04

## 2024-06-04 RX ORDER — NABUMETONE 750 MG/1
750 TABLET, FILM COATED ORAL
Qty: 46 | Refills: 2 | Status: DISCONTINUED | COMMUNITY
Start: 2023-10-17 | End: 2024-06-04

## 2024-06-06 PROBLEM — E78.5 HYPERLIPIDEMIA, UNSPECIFIED HYPERLIPIDEMIA TYPE: Status: ACTIVE | Noted: 2023-03-07

## 2024-06-09 PROBLEM — R13.10 DYSPHAGIA, IDIOPATHIC: Status: ACTIVE | Noted: 2024-02-20

## 2024-06-09 PROBLEM — R07.89 CHEST PAIN, ATYPICAL: Status: ACTIVE | Noted: 2023-03-07

## 2024-06-09 NOTE — HISTORY OF PRESENT ILLNESS
[FreeTextEntry8] : JAZLYN CHURCH is a 23 year F who presents for ED follow up  Seen at [facility] 5/23 for chest pain and associated SOB  Labs and CXR negative. EKG with no evidence of active ischemia.  Suspected to be 2/2 GERD for which she is following GI Dr. Odell   Today, denies CP or SOB but reports frequent dizzy spells. No episodes of syncope.  Also notes difficulty swallowing her pills in the morning. Of note, states dysphagia is chronic and believes symptom onset was approx 4 months ago when she swallowed a pill and felt sensation of it being stuck in her throat. Denies any episodes of choking.  Ate fried rice at 1:30PM and tolerated well.   Accompanied by  who is helping to translate

## 2024-06-09 NOTE — ASSESSMENT
[FreeTextEntry1] : CP -asymptomatic today -in the ed, ekg negative for acute ischemia, trop negative -pending TTE, stress test and follow up appt with cardio Dr. Singh 6/10  Dysphagia -chronic, suspected to be functional dysphagia with anxiety component -s/p egd 2/2024- unremarkable  -c/w amitriptyline  RTC PRN

## 2024-06-10 ENCOUNTER — APPOINTMENT (OUTPATIENT)
Dept: CARDIOLOGY | Facility: CLINIC | Age: 24
End: 2024-06-10
Payer: COMMERCIAL

## 2024-06-10 ENCOUNTER — NON-APPOINTMENT (OUTPATIENT)
Age: 24
End: 2024-06-10

## 2024-06-10 VITALS
DIASTOLIC BLOOD PRESSURE: 74 MMHG | HEIGHT: 63 IN | BODY MASS INDEX: 23.74 KG/M2 | SYSTOLIC BLOOD PRESSURE: 106 MMHG | HEART RATE: 95 BPM | WEIGHT: 134 LBS | OXYGEN SATURATION: 98 %

## 2024-06-10 DIAGNOSIS — E78.5 HYPERLIPIDEMIA, UNSPECIFIED: ICD-10-CM

## 2024-06-10 PROCEDURE — 93000 ELECTROCARDIOGRAM COMPLETE: CPT

## 2024-06-10 PROCEDURE — 99214 OFFICE O/P EST MOD 30 MIN: CPT

## 2024-06-10 PROCEDURE — 93306 TTE W/DOPPLER COMPLETE: CPT

## 2024-06-10 PROCEDURE — G2211 COMPLEX E/M VISIT ADD ON: CPT

## 2024-06-11 ENCOUNTER — APPOINTMENT (OUTPATIENT)
Dept: CARDIOLOGY | Facility: CLINIC | Age: 24
End: 2024-06-11
Payer: COMMERCIAL

## 2024-06-11 LAB
ESTIMATED AVERAGE GLUCOSE: 103 MG/DL
HBA1C MFR BLD HPLC: 5.2 %
HCG SERPL-MCNC: <1 MIU/ML
HCT VFR BLD CALC: 39.4 %
HGB BLD-MCNC: 12.5 G/DL
MCHC RBC-ENTMCNC: 27.3 PG
MCHC RBC-ENTMCNC: 31.7 GM/DL
MCV RBC AUTO: 86 FL
PLATELET # BLD AUTO: 227 K/UL
RBC # BLD: 4.58 M/UL
RBC # FLD: 12.9 %
WBC # FLD AUTO: 6.02 K/UL

## 2024-06-11 PROCEDURE — 93015 CV STRESS TEST SUPVJ I&R: CPT

## 2024-06-12 LAB
ALBUMIN SERPL ELPH-MCNC: 4.5 G/DL
ALP BLD-CCNC: 64 U/L
ALT SERPL-CCNC: 14 U/L
ANION GAP SERPL CALC-SCNC: 14 MMOL/L
APO LP(A) SERPL-MCNC: 111.3 NMOL/L
AST SERPL-CCNC: 20 U/L
BILIRUB SERPL-MCNC: 0.6 MG/DL
BUN SERPL-MCNC: 7 MG/DL
CALCIUM SERPL-MCNC: 10 MG/DL
CHLORIDE SERPL-SCNC: 103 MMOL/L
CHOLEST SERPL-MCNC: 132 MG/DL
CO2 SERPL-SCNC: 20 MMOL/L
CREAT SERPL-MCNC: 0.84 MG/DL
EGFR: 99 ML/MIN/1.73M2
GLUCOSE SERPL-MCNC: 66 MG/DL
HDLC SERPL-MCNC: 32 MG/DL
LDLC SERPL CALC-MCNC: 77 MG/DL
NONHDLC SERPL-MCNC: 99 MG/DL
POTASSIUM SERPL-SCNC: 4 MMOL/L
PROT SERPL-MCNC: 7.9 G/DL
SODIUM SERPL-SCNC: 137 MMOL/L
TRIGL SERPL-MCNC: 122 MG/DL
TSH SERPL-ACNC: 1.51 UIU/ML

## 2024-06-12 NOTE — ED ADULT NURSE NOTE - NS ED NURSE DISCH DISPOSITION
57 yo female returns for 3 month f/u visit.  She says she feels stable from a GI standpoint.  Juanita and I spoke 1.5 weeks ago about her nausea and I suggested stopping sulfasalazine rx by Dr. Mcdaniel and it resolved the nausea. Has joint aches of her right more than left hand.  Hips and knees and feet.  CD meds: Skyrizia 360 mg q 8 weeks (??better than entyvio - blood work factor) dicyclomine no aza/6mp or mtx.  Stoma out puts 8-10 per day. lomotil 6 tabs qid and immodium 4 tab qid. LABS  hb 9.8    ======================================= 3/27/24  54 yo female with severe Crohn's and short gut returns for 2 month f/u.  Saw Dr. Taylor on  and he took 13 vials of blood. Told he needed zinc. He rx Lomotil 6 po qid with Immodium 4 tid. That is not controlling outputs better. He got her a book on Short Gut.  No ER visits in the last 2 montsh.. Wt is stable at 131.5. TPN is down to 10 hours qod. Eating normally. Emppties pouch 8-10 times a day. Daily abd pain which is intermittent. Arthritis is worse. Allergic to sulfa but tolerating sulfasalazine and getting 1 bid from dr. Mcdaniel.  Never had total body arthritis. Every body part aches. Thumbs and hands are a mess.  She is allergic to tape. May she take a week off TPN TPN labs from 3/21 look very good with alb 3.6.    ============================= 24  54 yo female with severe CD and short gut now on TPN got 3 weeks after being off it for 2 months, has appt with  Guzman Taylor , and now here for interval f/u.  Things overall going well. Empties pouch 8-10 per day. TPN is 24 hours a day, may get decreased to 18 per day. Has intermittent abdominal pain, daily, and takes pain medicine to control it. Using 800 mg motrin for joint pain and abdominal pain. she knows Motrin can flare Crohns disease.  Dicyclomine 20 mg qid. Takes lomotil in conjunction with lomotil. Wants to increase lomotil from 2 bid to 2 qid.  Labs pending.     ============================== 23  54 yo female returns for 2 month f/u. She has improved greatly on TPN. She had approximately 2-3 months of TPN and home health. On Skyrizi 360 mg sq q 8w.  Empties pouch 8-12 times a day. No ER visits for 2-3 months while receiving TPN. Consultation with Dr. Taylor at [facility] is pending and currently for 2024. I was encouraged that on Skyrizi the stool biomarkers are nl.  Ab to try some meat and other foods at this point and will see how it goes.  ================================== 23  54 yo female with Crohn's disease comes in for post hosp f/u.  Last resection was 2023 and diarrhea worse since then. 6 foot resection per Juanita.????  Hosp for a week and d/c 9 days ago.  Syx on admission. Weakness and low BP. Saw Dr. Reynoso who admitted her.  Has Passport in left arm. Infusions: TPN from ??????? Getting 1000 ml fluid a day. Getting TPN at Home is 16 hours a day  Percocet Methocarbinmol Folic acid Lomotil 3 bid Immodiuym Paxil Gabapentin  [facility] has tincture of opium. Appt with Dr. Reynoso  She likes the skyrizi.   ================================ 7/10/23  54 yo female with h/o Breast cancer and Crohn's disease which is severe.  Doing well on Skyrizi. Diarrhea is better as result of Skyrizi and surgery. Arthritis is better.  Issues: 1) Ostomy outputs are increased. taking more lomotil, increased lomotil from 2 to 3 per day and 3 immodium tid.  Consider tincture of opium.  Multiple checks for c diff ---- never been positive.  Eating better. Blood pressure is low.  Offered a diecitian - declines.  Will use tincture of opium 5 drop qid in lieu of lomotil.    ============================ 23  53 yo female with severe CD. Had pain and vomiting and was admitted to [facility] 3/3 and had ileostomy revision and resection of 6 feet of intestin on 3/6/23 under Dr. Talamantes's care. Hosp 2.5 weeks.  Feeling so much better. Less pain.  still on oxycodone 7.5 - GP gives it to her. Hope is to get off pain. No steroids. Off budesonide Off mtx. Just got 2nd injection of Skyrizi  23===========  53 yo female with severe Crohn's on Skyrizi and budesonide 3mg one per day, methotrexate .5 cc qow --due to mouth ulcers, decreased from 1 cc per week, .5 cc per week and then the currnet .5 cc qow lomotil 2 bid Dicyclomine 4 (20)bid ----- should consider 2 qid Aciphex 1 bid  ESR has stayed elevated but CRP normalized on 22  Called our office Friday afternoon and spoke with Dr. Vuong. Called at 3:30p Friday and then called on call at 5p. c/o waking up with distension and hardness and gassy She thought it could be a partial blockage and so she took colace It sounded like a running faucet but has had regular output. Dr. Talamantes wants a CT stat --- to expedite. Problems: Severe Crohn's disease h/o Breast cancer High ostomy outputs abdominal pain Anorexia Weight loss - 3 lbs Immunosuppression Abdominal distension  ============================ 10/19/22  53 yo female for 2 month f/u. TH from home  Decision made to switch to Skyrizi.  Started Skyrizi in [place] about 6 weeks ago. Next appt is  - iv Dose 3 --- Likellt start 9;/15-Dose 1 Delay in getting Entyvio change to Skyrizi. She is not sure if the Skyrizi is working.   It was 4 weeks--- not excessive between last Entyvio and first Skyrizi.  Stooling is the same as 4 weeks ago. More abd pain Has heaviness and upper abdominal pain.   ====================================================== 22  53 yo female comes in with  Guzman for 3 week follow up.  She feels her Crohn's is better. Ileostomy outputs similar. 5-7 per day. No blood. Mouth is much better. Less pain. Rectum is in place. Mucus and stool per rectum. Blood per rectum She feels closing her up is not an answer.  Problems with back --- spinal stenosis - Guzman Barajas MD [facility]. Hip and Knee hurt. Has an appointment with [facility].  Not eating much. Lost 6 more lbs. Been sick for 1.5 weeks - does not know. Headache and neck ache, could not lift head. Has been exhausted and dizzy. Can't walk in a straight line.  Has primary MD. Slade Auguste MD - [place]. Less steady when whe walks. Very weak. Difficulty getting here for the appointment.  Not eating because not hung  Meds: Entyvio 300 mg iv q 4w Gabapentin 300 mg po tid started 3 weeks ago Lomotil Paxil Dicyclomine Folic acid Ibuprofen Trazodone  For upcoming back fusion with Dr Jenn Adkinsgic to tape, levoquin, sulfa   ===================== 22  53 yo female with severe CD on Entyvio 300 mg iv q 4 w and mtx 12.5 mg sq q week Budesonide 3 mg a day and folic acid.  Main complaints are: Mouth ulcers Abdominal pain Diarrhea- empties bad 7 times Bad day more than 10/ No blood in stool. Nausea - continuous - awakens with nausea. Eats and nausea. PPI- Aciphex 20 mg bid. Trazodone. No other meds  Next infusion next week.   ======================= 22  54 yo female comes in for urgent f/u re; need for Promethazine 6.25 mg/5ml solution as this works better for her when she has acute nausea and nausea unrelieved by ondansetron. I feel this is an appropriate need and should be approved by insurance even though it is non-formulary.  MRE is pending and plan per radiology is to use barium as volumen as she vomits with it.  She experiences nausea every day due to bowel dysfunction from active Crohns disease and other factors.  Meds: Entyvio 300 mg iv q 4 w Methotrexate 1 cc per week. Wants to reduce to .5 cc per week and that is fine. Folic acid 1 mg a da\7 Budesonide 3 mg now, reduced from 9 m,g =============================== 22  54 yo female returns for 5 week f/u. Stoma is fully healed. No issues with the appliances. Emptying bag 5-6 times a day and coping.  She has been having abdominal pain for the past few weeks. It felt like someone was "trying to pull the uterus out" and she calls it scar tissue. There was nothing on US, to cause the pain. MRE from 21 showed ??problable low grade partial SBO but this may be dysfunction.  She came to see me for the iron deficiency anemia. She has noticed some bleeding. She still has urgency to move her bowels. Yesterday was scary becasue she saw blood in the rectal fluid. Passes rectal fluid 8-10 times a day. At night, it can be horrendous. Bottom of diaper is full.  On Entyvio 300 mg iv q 4 Methotrexate 1 cc q week Budesonide -- 3m 3 per day. -Was feeling bad 6 months ago or so and Bud was started. May be worth trying to taper   ============================= 22  54 yo female with CD and ileostomy comes in for 3 month f/u visit.  Finally healing from stoma revision 10/15/21.  Stoma never stuck out enough and kept  Dr. Talamantes did a stoma revision. Mild "complication" as skin around the stoma, stoma had a "moat" around it.  Empties bag 5-6 times. Interval check. Eating ok. Mouth is healing from the leucovorin to lessen the effect of mtx on mouth.  Getting guidance from Myerson.  ===================================== 10/6/21  54 yo female with Crohn's and avascular necrosis and saw Dr. Myerson 2 days ago and he did blood work has some plans - f/u in 3 weeks.  Sees Dr. Gardiner tomorrow to address major ostomy issues.  Considering ostomy revision. Retracted stoma.  Saw her mother and needed more supplies due to leaking.  Entocort now for 10 weeks, 6 mg a day, empties ostomy 6-9 times.  Has leaked but not in recent weeks.  Never on 9 mg and capri 6 mg for 10 weeks.  Try alternating 6 mg and 3 mg on alternate days.    ========================  21  54 yo female with CD now for 1 month f/u after telehealth and doing better 5 weeks into course of Entocort tx.  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  Was on fosamax. I just called Dr. Myerson to arrange an appoinment.  Ostomy - empties it 6 times so far today. Occasional abdominal pain.  On Entyvio every 4 weeks Entocort 9 mg a day x 5 weeks.  Asked to have CD4/CD8 checked, by sister's immunologist.   ===================== 8/3/21  54 yo female for 'TH f/u due to beeing weak and in bed. In ER last week and they sent her home.  Meds: Entyvio 300 mg q 4 weeks Entocort x 1 week Off prednisone - had been on prednisone- she does not think it is what was helping her. On 1 cc mtx per week.  Devin Auguste is primary MD Saw him a month ago.  Main syx now.5 days ago was throwing up and was so distended. Could not poke her stomach. went to the ER Wednesday. N/V. Aciphex 1 twice daily. Last looked Never had an ulcer. Ileostomy in place.  Fever on Friday, up to 101. Eating now. Hungry yesterday  Not vomiting.  May neede EGD.     ====================== 21  51 yo female comes in for f/u.  She is "off" per Dr. Jaquez. She did an MRI. Dr. Jaquez reportedly thinks it is her meds. Has not seen a neurologist in years. Recent  labs were nl mostly with Hb 10.1, nl K-4.0 and Ca. Nl ESR and CRP, but had just finished medrol dose eliza.  Wants a Mg level. Labs to be sent to [facility]. Nl quantiferon.  Currently , getting Entyvio every 4 weeks.  Getting entyvio every 4 weeks. Taking 2 weeks off mtx due to stomatitis. If resuming, would reduce from 1 cc to .5 cc..  She is not sure how much she needs mtx and how much it has helped in the past. Mtx has very likely caused the stomatis. Or the pred medrol could have helped the mouth and belly. She has avn all over.  Myerson will see her and order bone scan  Addendum ------21 Has thicker stools Has to change her bag because of high outputs She says her stoma is an outie  She is on pred 20 mg and should taper off it by going to 10 mg to 5 mg for a week and then d/c Risks of prednisone discussed.  Had dropped mtx from 25 to 12.5 and will go back up to 25 mg a week. Consider [facility] referral if not improvimg at that point. ============ 21  51 yo female with CD and ostomy for 5 month f/u.  Abd is good. Ostomy is functioning. Sleep thru much of night.  Appetite and intake is better. Oral CD problematic. Has been in touch with Dr. Pepe ---- swish and spit.  Max-- 150. Current weight is 121.  Not taking supplements -- Boost and Ensure. 2-3 cans The y don't agree with her.. R Lactose free ice  She wants low dose prednisone. Lets try 2.5-5 mg a day.  ================== 20  51 yo female for 3 week TH f/u. Has upper abdominal pain that may be due to gallstones. Had referred her to Dr. Moreland but she did not call. She heard he was retiring and not. Has abdominal pain when she. Left upper quadrant or "upper rib cage" mostly left. He knows Dr. Talamantes. - Frustrated---- blisters on lip. Torn open. Has not tried steroid rinse or paste in past. Magic mouth wash helps. Does not reduce the blisters. Dr. Best- non steroid options for mouth   ======================== 10/23/20  10/23 51 yo female for 3 week  f/u. Has oral Crohn's and I-C and stoma issues. Entyvio may not cover the EIM. Might try oral steroid lozenge --- pred paste and lozenges.  Entyvio 300 q 4 weeks Mtx 1 cc q week Pred 5 mg may stop. Bentyl -  Abd pain is ok No idea what triggered her admission Food induced -  Consider medrol dose eliza in the future  Extensive lab review CBC --- Hct 31.6/Hb 10.9 plt 120 CRP 22 ESR 56 Iron 33% Ferrintin good ================================================== 10/2/20  51 yo female with Crohn's disease recently hosp 3 days at [facility] for abd pain, N/V and got better with 3 dasy 3 days of iv steroids.  Surgery considered.  Was on 40 mg pred - intolerant anxiety  Now dropped to 30 mg a day. Now is 50-60% better than admission.  Surgery debated gallstones. They leaned toward blockage.  Has been on Stelara. Entyvio works better than Stelara. Mtx makes her tired She would like to up the dose of lomotil.  20  50 yo female with Crohnn's disease having continued active syx. Getting Entyvio every 4 weeks. Methotrexate 12.5 mg per week Dicyclomine tablets 2 po qid  == She has taken Xifaxan and it tears her up. Then stop it. Does not like flagyl and does not help.  Pepto 1-2 doses a day, does help the diarreha. She uses Immodium every day.  Stooling ----- empties bag 6-10 times a day. Abdominal pain - when she eats and intermittent. Energy - gone.  Labs- Anemia - Hb lower CRP is higher ---34 (was 16).   ========================== 20  Crohn's disease.   50 yo female with Crohn's disease comes in for f/u.  Dr. Jaquez is considering a GYN etiology.  Needs a pouch exam.  Getting Entyvio every 4 weeks.  Stelara did not work as well as Entyvio.  Normally when she gets an infusion, she gets a bounce and has not.  Prometheus Vedo level 3/25, just before 4 week dose was great_ __ _32.5 and no antibodies.  Feb 10 labs looked good CRP 29.9 and cbc looked fine. ESR elevation.  2 liters of miralax_ __ _ Try 6-8am _ __ _- and then an exam at 2-3 pm She asked about a pilonidal cyst and I directed to Dr. Albin Owen  Try pepto bismol 1-2 prn on days.    ===== 20  50 yo female comes in at my request for 3 week f/u of Crohn's due to new 4 x eleation of CRP at 29.9.  19 CRP was similarly elevated 4x at 2.1/.5  Leonid is checking CT abd-pelvis due to pelvic pain which is not characteristic of her Crohn's.  She is wondering about GYN. Or spastic bladder.  Dr. Talamantes thought stable but wants to scope to be sure.  Empties bag 10x a day.That is more than 3-6 months ago and has leakage of the ostomy bag. Maybe more volume. Very watery.  Review of labs from 2/10 show cRP 29.i9 but others are stable. CBC looks good with trace elevation of wbc and borderline anemia. Getting iv iron from Arianna Jaquez. Vit D is normal at 39. Albumin is normal at 4.5.  MRI 10/1/19 showed gallstones , present in past, and she has mild pain that is intermittent Liver tests are nl. Can assess with HIDA, and/or with surgical consult, e.g ???relative need for choly, high vs. low.    ================ 20  50 yo female with severe Crohn's disease and h/o breast cancer _ _ Arianna Jaquez MD on Letrasol. 10th year.  Abdominal pain is confusing. Has gotten pain in the ovary and in the uterus. GYN-Ann Laird MD and did US was fine. Sent to PT for pelvic treatment.  At her comment, I reviewed [facility] imaging 16 nl CTE 5/3/17 nl GB US Oct 2 2019 "Cholelithiasis" o/w MRE was nl - no inflammation. BUT THE MRI DID SHOW "PERSISTENT TETHERING OF UTERUS TO RECTAL STUMP"  On Entyvio 300 mg q 4 weeks. Next dose in 2 weeks On mtx _ _.5 cc. Monthly b12 shots  Eating ok, some weight loss. Weight stable x 6 months and down 30 lb since _ __ _- she is comfortable with current weight.  ============= 19  50 yo female with severe Crohn's and h/o breast cancer comes in for 3 month f/u.  Doing poorly.  Daily abd pain which is intermittent.  When she eats she gets pain and nausea.  Taking zofran and phenergan.  Sleep pattern is awry.  Empties bag 5 times a day and going out of rectum.  Has a loop ileostomy that may be problematic.  Allergic to tape.  Currently on Entyvio 300 mg q 8 weeks.  Stelara did not work as well.  Discussed the entyvio at shorter cycles of q 4 weeks to see if that will control syx better.  Outputs are higher and she may be volume depleted and maybe more entyvio will help.  last entyvio was last week and so a level won't be useful today. Will just proceed with dose increase.    ============== 18 48 yo female with Crohn's disease and breast cancer and diverting ileostomy from Dr. Talamantes.  Skin issue - addressed by Dr. Best.  See Brooke Talamantes MD and enterostomal therapist.  AGWS.  No fcs.     Review of Systems ConstitutionalDenies : body aches, fever, weight gain, weight loss CardiovascularDenies : chest pain, heart racing/skipping, high blood pressure RespiratoryDenies : chronic cough, shortness of breath, wheezing or asthma symptoms GastrointestinalDenies : Abdominal Pain/discomfort, Anal/Rectal Pain or Itching, Anal Spasm, Black Stool, Bloating/belching/gaseouness, Change of Bowel Habit, Constipation, Diarrhea/Loose Stool, Difficulty in Swallowing, Heartburn/esophageal reflux, Hemorrhoids, Indigestion, Mucus in Stool, Nausea/vomiting, Rectal Bleeding, Unintentional Weight Loss   Vitals Date Time BP Position Site L\R Cuff Size HR RR TEMP (F) WT HT BMI kg/m2 BSA m2 O2 Sat HC  2020 05:15 PM 133lbs 0oz 5' 8.5" 19.93 1.71       Assessment Crohn's Disease 555.2  Diarrhea 787.91/R19.7  Cholelithiasis 574.20/K80.20  Immunosuppression due to drug therapy V58.69/Z79.899   Problems Reconciled Plan OrdersPatient not identified as an unhealthy alcohol user when screene () - - 2020 Influenza immunization administered or previously received () - - 2020 BMI screening above normal () - - 2020 Current tobacco non-user (CAD, cap, COPD, PV) (dm) (IBD) (1036F, ) - - 2020 Colorectal cancer screening results documented and reviewed (PV) (3017F) - - 2020 Documentation of current medications. () - - 2020 EGD w/Biopsy if indicated (31533) - - 2020 Pouchoscopy (43068) - - 2020 CMP (comprehensive metabolic panel) (80296) - - 2020 Sed rate (87513) - - 2020 C-reactive protein (66344) - - 2020 Ferritin assay (36792) - - 2020 Iron + iron binding capacity panel ser/plas (27963, 35016) - - 2020 Vitamin B12 and folate measurement (79993, 14452) - - 2020 CBC with diff (52466) - - 2020 25-OH-Vitamin D (22053) - - 2020 GPP 22 (FilmArray) - Gastrointestinal Pathogen Panel - QDx (28397) - - 2020 MedicationsMedications have been Reconciled Transition of Care or Provider Policy InstructionsPatient seen today via telehealth by agreement and consent of patient in light of current COVID-19 pandemic. I used video conferencing during the visit. The patient encounter is appropriate and reasonable under the circumstances given the patient's particular presentation at this time. The patient has been advised of the followin) the potential risks and limitations of this mode of treatment (including but not limited to the absence of in-person examination); 2) the right to refuse telehealth services at any point without affecting the right to future care; 3) the right to receive in-person services, included immediately after this consultation if an urgent need arises; 4) information, including identifiable images or information from this telehealth consult, will only be shared in accordance with HIPPA regulations. Any and all of the patient's and/or patient's family member's questions on this issue have been answered. The patient has verbally consented to be treated via telehealth services. The patient has also been advised to contact this office for worsening conditions or problems, and seek emergency medical treatment and/or call 911 if the patient deems either necessary. (For commercial payers, telehealth video only) More than half of the face-to-face time used for counseling and coordination of care. 40 min appointment (95402 EP) I have documented a list of current medications and reviewed it with the patient. I encouraged the patient to diet and exercise. DispositionReturn To Clinic in 2 Months CorrespondenceCC this document (Slade Auguste MD, Brooke Talamantes MD, Arianna Jaquez MD) - 2020  38456 exam med 32 min time  needs refill or lomotil Can't eRx lomotil.  Aciphex refill.  Needs EGD and pouchoscopy        Electronically Signed by: Sánchez Perez MD -A  cc: note to Dr. Brooke Talamantes 23 Discharged

## 2024-06-25 ENCOUNTER — NON-APPOINTMENT (OUTPATIENT)
Age: 24
End: 2024-06-25

## 2024-07-30 ENCOUNTER — RX RENEWAL (OUTPATIENT)
Age: 24
End: 2024-07-30

## 2024-08-13 ENCOUNTER — APPOINTMENT (OUTPATIENT)
Dept: CARDIOLOGY | Facility: CLINIC | Age: 24
End: 2024-08-13

## 2024-08-16 ENCOUNTER — RX RENEWAL (OUTPATIENT)
Age: 24
End: 2024-08-16

## 2024-08-26 ENCOUNTER — RX RENEWAL (OUTPATIENT)
Age: 24
End: 2024-08-26

## 2024-12-02 ENCOUNTER — APPOINTMENT (OUTPATIENT)
Dept: INTERNAL MEDICINE | Facility: CLINIC | Age: 24
End: 2024-12-02
Payer: COMMERCIAL

## 2024-12-02 VITALS
DIASTOLIC BLOOD PRESSURE: 78 MMHG | WEIGHT: 139 LBS | OXYGEN SATURATION: 98 % | HEIGHT: 63 IN | SYSTOLIC BLOOD PRESSURE: 113 MMHG | HEART RATE: 86 BPM | BODY MASS INDEX: 24.63 KG/M2 | TEMPERATURE: 98.4 F

## 2024-12-02 DIAGNOSIS — J30.2 OTHER SEASONAL ALLERGIC RHINITIS: ICD-10-CM

## 2024-12-02 DIAGNOSIS — E78.5 HYPERLIPIDEMIA, UNSPECIFIED: ICD-10-CM

## 2024-12-02 DIAGNOSIS — F41.9 ANXIETY DISORDER, UNSPECIFIED: ICD-10-CM

## 2024-12-02 PROCEDURE — G2211 COMPLEX E/M VISIT ADD ON: CPT | Mod: NC

## 2024-12-02 PROCEDURE — 99214 OFFICE O/P EST MOD 30 MIN: CPT

## 2024-12-02 RX ORDER — FLUTICASONE PROPIONATE 50 UG/1
50 SPRAY, METERED NASAL
Qty: 1 | Refills: 0 | Status: ACTIVE | COMMUNITY
Start: 2024-12-02 | End: 1900-01-01

## 2024-12-02 RX ORDER — HYDROCORTISONE VALERATE 2 MG/G
0.2 CREAM TOPICAL
Qty: 45 | Refills: 0 | Status: ACTIVE | COMMUNITY
Start: 2024-11-26

## 2024-12-02 RX ORDER — ESCITALOPRAM OXALATE 5 MG/1
5 TABLET ORAL
Qty: 90 | Refills: 0 | Status: ACTIVE | COMMUNITY
Start: 2024-12-02 | End: 1900-01-01

## 2024-12-03 LAB
ALBUMIN SERPL ELPH-MCNC: 4.4 G/DL
ALP BLD-CCNC: 72 U/L
ALT SERPL-CCNC: 28 U/L
ANION GAP SERPL CALC-SCNC: 12 MMOL/L
AST SERPL-CCNC: 27 U/L
BILIRUB SERPL-MCNC: 0.4 MG/DL
BUN SERPL-MCNC: 8 MG/DL
CALCIUM SERPL-MCNC: 9.8 MG/DL
CHLORIDE SERPL-SCNC: 103 MMOL/L
CHOLEST SERPL-MCNC: 148 MG/DL
CO2 SERPL-SCNC: 22 MMOL/L
CREAT SERPL-MCNC: 0.71 MG/DL
EGFR: 121 ML/MIN/1.73M2
GLUCOSE SERPL-MCNC: 88 MG/DL
HDLC SERPL-MCNC: 33 MG/DL
LDLC SERPL CALC-MCNC: 66 MG/DL
NONHDLC SERPL-MCNC: 115 MG/DL
POTASSIUM SERPL-SCNC: 4.1 MMOL/L
PROT SERPL-MCNC: 7.4 G/DL
SODIUM SERPL-SCNC: 137 MMOL/L
TRIGL SERPL-MCNC: 311 MG/DL

## 2024-12-25 ENCOUNTER — RX RENEWAL (OUTPATIENT)
Age: 24
End: 2024-12-25

## 2024-12-26 ENCOUNTER — RX CHANGE (OUTPATIENT)
Age: 24
End: 2024-12-26

## 2024-12-26 RX ORDER — FLUTICASONE PROPIONATE 50 UG/1
50 SPRAY, METERED NASAL
Qty: 48 | Refills: 0 | Status: ACTIVE | COMMUNITY
Start: 1900-01-01 | End: 1900-01-01

## 2025-01-14 ENCOUNTER — APPOINTMENT (OUTPATIENT)
Dept: OBGYN | Facility: CLINIC | Age: 25
End: 2025-01-14

## 2025-01-28 ENCOUNTER — APPOINTMENT (OUTPATIENT)
Dept: CARDIOLOGY | Facility: CLINIC | Age: 25
End: 2025-01-28

## 2025-01-28 ENCOUNTER — APPOINTMENT (OUTPATIENT)
Dept: CARDIOLOGY | Facility: CLINIC | Age: 25
End: 2025-01-28
Payer: COMMERCIAL

## 2025-01-28 DIAGNOSIS — E78.5 HYPERLIPIDEMIA, UNSPECIFIED: ICD-10-CM

## 2025-01-28 DIAGNOSIS — R01.1 CARDIAC MURMUR, UNSPECIFIED: ICD-10-CM

## 2025-01-28 DIAGNOSIS — R94.31 ABNORMAL ELECTROCARDIOGRAM [ECG] [EKG]: ICD-10-CM

## 2025-01-28 DIAGNOSIS — E78.00 PURE HYPERCHOLESTEROLEMIA, UNSPECIFIED: ICD-10-CM

## 2025-01-28 DIAGNOSIS — Z00.00 ENCOUNTER FOR GENERAL ADULT MEDICAL EXAMINATION W/OUT ABNORMAL FINDINGS: ICD-10-CM

## 2025-01-28 PROCEDURE — 99214 OFFICE O/P EST MOD 30 MIN: CPT | Mod: 95

## 2025-01-28 PROCEDURE — G2211 COMPLEX E/M VISIT ADD ON: CPT | Mod: 95

## 2025-01-28 RX ORDER — ICOSAPENT ETHYL 1 G/1
1 CAPSULE ORAL
Qty: 360 | Refills: 0 | Status: ACTIVE | COMMUNITY
Start: 2025-01-28 | End: 1900-01-01

## 2025-02-03 ENCOUNTER — LABORATORY RESULT (OUTPATIENT)
Age: 25
End: 2025-02-03

## 2025-02-03 ENCOUNTER — APPOINTMENT (OUTPATIENT)
Dept: OBGYN | Facility: CLINIC | Age: 25
End: 2025-02-03
Payer: COMMERCIAL

## 2025-02-03 ENCOUNTER — NON-APPOINTMENT (OUTPATIENT)
Age: 25
End: 2025-02-03

## 2025-02-03 ENCOUNTER — OUTPATIENT (OUTPATIENT)
Dept: OUTPATIENT SERVICES | Facility: HOSPITAL | Age: 25
LOS: 1 days | End: 2025-02-03
Payer: COMMERCIAL

## 2025-02-03 VITALS — DIASTOLIC BLOOD PRESSURE: 74 MMHG | BODY MASS INDEX: 25.69 KG/M2 | SYSTOLIC BLOOD PRESSURE: 118 MMHG | WEIGHT: 145 LBS

## 2025-02-03 DIAGNOSIS — Z01.419 ENCOUNTER FOR GYNECOLOGICAL EXAMINATION (GENERAL) (ROUTINE) W/OUT ABNORMAL FINDINGS: ICD-10-CM

## 2025-02-03 DIAGNOSIS — N76.0 ACUTE VAGINITIS: ICD-10-CM

## 2025-02-03 PROCEDURE — 90471 IMMUNIZATION ADMIN: CPT

## 2025-02-03 PROCEDURE — 87661 TRICHOMONAS VAGINALIS AMPLIF: CPT

## 2025-02-03 PROCEDURE — 87481 CANDIDA DNA AMP PROBE: CPT

## 2025-02-03 PROCEDURE — G0463: CPT

## 2025-02-03 PROCEDURE — 90651 9VHPV VACCINE 2/3 DOSE IM: CPT

## 2025-02-03 PROCEDURE — 81513 NFCT DS BV RNA VAG FLU ALG: CPT

## 2025-02-03 PROCEDURE — 99395 PREV VISIT EST AGE 18-39: CPT | Mod: 25

## 2025-02-03 PROCEDURE — 90651 9VHPV VACCINE 2/3 DOSE IM: CPT | Mod: NC

## 2025-02-03 PROCEDURE — 87491 CHLMYD TRACH DNA AMP PROBE: CPT

## 2025-02-03 PROCEDURE — 87591 N.GONORRHOEAE DNA AMP PROB: CPT

## 2025-02-04 LAB
BV BACTERIA RRNA VAG QL NAA+PROBE: SIGNIFICANT CHANGE UP
C GLABRATA RNA VAG QL NAA+PROBE: SIGNIFICANT CHANGE UP
C TRACH RRNA SPEC QL NAA+PROBE: SIGNIFICANT CHANGE UP
CANDIDA RRNA VAG QL PROBE: SIGNIFICANT CHANGE UP
N GONORRHOEA RRNA SPEC QL NAA+PROBE: SIGNIFICANT CHANGE UP
T VAGINALIS RRNA SPEC QL NAA+PROBE: SIGNIFICANT CHANGE UP

## 2025-02-05 LAB — CYTOLOGY SPEC DOC CYTO: SIGNIFICANT CHANGE UP

## 2025-02-11 DIAGNOSIS — Z23 ENCOUNTER FOR IMMUNIZATION: ICD-10-CM

## 2025-02-11 DIAGNOSIS — Z01.419 ENCOUNTER FOR GYNECOLOGICAL EXAMINATION (GENERAL) (ROUTINE) WITHOUT ABNORMAL FINDINGS: ICD-10-CM

## 2025-02-28 ENCOUNTER — RX RENEWAL (OUTPATIENT)
Age: 25
End: 2025-02-28

## 2025-03-26 ENCOUNTER — RX RENEWAL (OUTPATIENT)
Age: 25
End: 2025-03-26

## 2025-04-17 ENCOUNTER — APPOINTMENT (OUTPATIENT)
Dept: OBGYN | Facility: CLINIC | Age: 25
End: 2025-04-17

## 2025-04-17 ENCOUNTER — LABORATORY RESULT (OUTPATIENT)
Age: 25
End: 2025-04-17

## 2025-04-17 ENCOUNTER — OUTPATIENT (OUTPATIENT)
Dept: OUTPATIENT SERVICES | Facility: HOSPITAL | Age: 25
LOS: 1 days | End: 2025-04-17
Payer: COMMERCIAL

## 2025-04-17 DIAGNOSIS — N76.0 ACUTE VAGINITIS: ICD-10-CM

## 2025-04-17 DIAGNOSIS — N92.6 IRREGULAR MENSTRUATION, UNSPECIFIED: ICD-10-CM

## 2025-04-17 LAB
ESTRADIOL FREE SERPL-MCNC: 31 PG/ML — SIGNIFICANT CHANGE UP
FSH SERPL-MCNC: 5 IU/L — SIGNIFICANT CHANGE UP
PROLACTIN SERPL-MCNC: 9.6 NG/ML — SIGNIFICANT CHANGE UP (ref 3.4–24.1)
T4 FREE+ TSH PNL SERPL: 1.97 UIU/ML — SIGNIFICANT CHANGE UP (ref 0.27–4.2)

## 2025-04-17 PROCEDURE — 82670 ASSAY OF TOTAL ESTRADIOL: CPT

## 2025-04-17 PROCEDURE — 36415 COLL VENOUS BLD VENIPUNCTURE: CPT

## 2025-04-17 PROCEDURE — 84443 ASSAY THYROID STIM HORMONE: CPT

## 2025-04-17 PROCEDURE — G0463: CPT

## 2025-04-17 PROCEDURE — 99213 OFFICE O/P EST LOW 20 MIN: CPT

## 2025-04-17 PROCEDURE — 84146 ASSAY OF PROLACTIN: CPT

## 2025-04-17 PROCEDURE — 83001 ASSAY OF GONADOTROPIN (FSH): CPT

## 2025-04-17 RX ORDER — MEDROXYPROGESTERONE ACETATE 10 MG/1
10 TABLET ORAL DAILY
Qty: 5 | Refills: 0 | Status: ACTIVE | COMMUNITY
Start: 2025-04-17 | End: 1900-01-01

## 2025-05-02 DIAGNOSIS — N92.6 IRREGULAR MENSTRUATION, UNSPECIFIED: ICD-10-CM

## 2025-05-27 ENCOUNTER — RX RENEWAL (OUTPATIENT)
Age: 25
End: 2025-05-27